# Patient Record
Sex: MALE | Race: BLACK OR AFRICAN AMERICAN | NOT HISPANIC OR LATINO | Employment: OTHER | URBAN - NONMETROPOLITAN AREA
[De-identification: names, ages, dates, MRNs, and addresses within clinical notes are randomized per-mention and may not be internally consistent; named-entity substitution may affect disease eponyms.]

---

## 2018-12-20 ENCOUNTER — TRANSCRIBE ORDERS (OUTPATIENT)
Dept: LAB | Facility: HOSPITAL | Age: 43
End: 2018-12-20

## 2018-12-20 ENCOUNTER — HOSPITAL ENCOUNTER (OUTPATIENT)
Dept: GENERAL RADIOLOGY | Facility: HOSPITAL | Age: 43
Discharge: HOME | End: 2018-12-20
Admitting: NURSE PRACTITIONER

## 2018-12-20 DIAGNOSIS — S86.912A KNEE STRAIN, LEFT, INITIAL ENCOUNTER: Primary | ICD-10-CM

## 2018-12-20 DIAGNOSIS — S86.912A KNEE STRAIN, LEFT, INITIAL ENCOUNTER: ICD-10-CM

## 2018-12-20 PROCEDURE — 73564 X-RAY EXAM KNEE 4 OR MORE: CPT | Performed by: RADIOLOGY

## 2018-12-20 PROCEDURE — 73564 X-RAY EXAM KNEE 4 OR MORE: CPT

## 2018-12-26 ENCOUNTER — APPOINTMENT (OUTPATIENT)
Dept: GENERAL RADIOLOGY | Facility: HOSPITAL | Age: 43
End: 2018-12-26

## 2018-12-26 ENCOUNTER — HOSPITAL ENCOUNTER (EMERGENCY)
Facility: HOSPITAL | Age: 43
Discharge: HOME | End: 2018-12-26
Attending: FAMILY MEDICINE | Admitting: FAMILY MEDICINE

## 2018-12-26 VITALS
WEIGHT: 200 LBS | TEMPERATURE: 97.9 F | BODY MASS INDEX: 28 KG/M2 | SYSTOLIC BLOOD PRESSURE: 126 MMHG | OXYGEN SATURATION: 98 % | HEART RATE: 74 BPM | DIASTOLIC BLOOD PRESSURE: 76 MMHG | HEIGHT: 71 IN | RESPIRATION RATE: 20 BRPM

## 2018-12-26 DIAGNOSIS — M25.562 ACUTE PAIN OF LEFT KNEE: Primary | ICD-10-CM

## 2018-12-26 PROCEDURE — 73564 X-RAY EXAM KNEE 4 OR MORE: CPT | Performed by: RADIOLOGY

## 2018-12-26 PROCEDURE — 73562 X-RAY EXAM OF KNEE 3: CPT

## 2018-12-26 PROCEDURE — 99283 EMERGENCY DEPT VISIT LOW MDM: CPT

## 2018-12-26 RX ORDER — IBUPROFEN 800 MG/1
800 TABLET ORAL EVERY 6 HOURS PRN
COMMUNITY
End: 2019-08-19

## 2019-02-18 ENCOUNTER — TRANSCRIBE ORDERS (OUTPATIENT)
Dept: ADMINISTRATIVE | Facility: HOSPITAL | Age: 44
End: 2019-02-18

## 2019-02-18 DIAGNOSIS — M54.41 LOW BACK PAIN WITH BILATERAL SCIATICA, UNSPECIFIED BACK PAIN LATERALITY, UNSPECIFIED CHRONICITY: Primary | ICD-10-CM

## 2019-02-18 DIAGNOSIS — M54.42 LOW BACK PAIN WITH BILATERAL SCIATICA, UNSPECIFIED BACK PAIN LATERALITY, UNSPECIFIED CHRONICITY: Primary | ICD-10-CM

## 2019-02-20 ENCOUNTER — HOSPITAL ENCOUNTER (OUTPATIENT)
Dept: MRI IMAGING | Facility: HOSPITAL | Age: 44
Discharge: HOME | End: 2019-02-20
Admitting: FAMILY MEDICINE

## 2019-02-20 DIAGNOSIS — M54.41 LOW BACK PAIN WITH BILATERAL SCIATICA, UNSPECIFIED BACK PAIN LATERALITY, UNSPECIFIED CHRONICITY: ICD-10-CM

## 2019-02-20 DIAGNOSIS — M54.42 LOW BACK PAIN WITH BILATERAL SCIATICA, UNSPECIFIED BACK PAIN LATERALITY, UNSPECIFIED CHRONICITY: ICD-10-CM

## 2019-02-20 PROCEDURE — 72148 MRI LUMBAR SPINE W/O DYE: CPT

## 2019-02-20 PROCEDURE — 72148 MRI LUMBAR SPINE W/O DYE: CPT | Performed by: RADIOLOGY

## 2019-03-05 ENCOUNTER — OFFICE VISIT (OUTPATIENT)
Dept: NEUROSURGERY | Facility: CLINIC | Age: 44
End: 2019-03-05

## 2019-03-05 VITALS — BODY MASS INDEX: 28 KG/M2 | HEIGHT: 71 IN | WEIGHT: 200 LBS

## 2019-03-05 DIAGNOSIS — M51.36 BULGING LUMBAR DISC: Primary | ICD-10-CM

## 2019-03-05 DIAGNOSIS — M51.26 HERNIATION OF INTERVERTEBRAL DISC OF LUMBAR SPINE: ICD-10-CM

## 2019-03-05 PROCEDURE — 99243 OFF/OP CNSLTJ NEW/EST LOW 30: CPT | Performed by: NEUROLOGICAL SURGERY
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
## 2019-03-10 ENCOUNTER — HOSPITAL ENCOUNTER (EMERGENCY)
Facility: HOSPITAL | Age: 44
Discharge: HOME | End: 2019-03-10
Attending: EMERGENCY MEDICINE | Admitting: EMERGENCY MEDICINE

## 2019-03-10 VITALS
HEART RATE: 75 BPM | DIASTOLIC BLOOD PRESSURE: 84 MMHG | TEMPERATURE: 98.3 F | WEIGHT: 210 LBS | BODY MASS INDEX: 29.4 KG/M2 | RESPIRATION RATE: 18 BRPM | OXYGEN SATURATION: 98 % | HEIGHT: 71 IN | SYSTOLIC BLOOD PRESSURE: 117 MMHG

## 2019-03-10 DIAGNOSIS — M54.41 MIDLINE LOW BACK PAIN WITH RIGHT-SIDED SCIATICA, UNSPECIFIED CHRONICITY: Primary | ICD-10-CM

## 2019-03-10 PROCEDURE — 96372 THER/PROPH/DIAG INJ SC/IM: CPT

## 2019-03-10 PROCEDURE — 99283 EMERGENCY DEPT VISIT LOW MDM: CPT

## 2019-03-10 PROCEDURE — 25010000002 KETOROLAC TROMETHAMINE PER 15 MG: Performed by: PHYSICIAN ASSISTANT

## 2019-03-10 RX ORDER — KETOROLAC TROMETHAMINE 30 MG/ML
60 INJECTION, SOLUTION INTRAMUSCULAR; INTRAVENOUS ONCE
Status: COMPLETED | OUTPATIENT
Start: 2019-03-10 | End: 2019-03-10

## 2019-03-10 RX ORDER — ORPHENADRINE CITRATE 30 MG/ML
60 INJECTION INTRAMUSCULAR; INTRAVENOUS ONCE
Status: DISCONTINUED | OUTPATIENT
Start: 2019-03-10 | End: 2019-03-10

## 2019-03-10 RX ORDER — ACETAMINOPHEN AND CODEINE PHOSPHATE 300; 30 MG/1; MG/1
1 TABLET ORAL EVERY 6 HOURS PRN
Qty: 12 TABLET | Refills: 0 | Status: SHIPPED | OUTPATIENT
Start: 2019-03-10 | End: 2019-04-07 | Stop reason: SDUPTHER

## 2019-03-10 RX ADMIN — KETOROLAC TROMETHAMINE 60 MG: 60 INJECTION, SOLUTION INTRAMUSCULAR at 10:18

## 2019-03-10 NOTE — ED PROVIDER NOTES
Subjective   Back pain and left knee pain had neg xray on knee at office   Wants something for pain         History provided by:  Patient  Back Pain   Location:  Lumbar spine  Quality:  Aching  Radiates to:  Does not radiate  Pain severity:  Mild  Pain is:  Same all the time  Onset quality:  Gradual  Duration:  3 days  Timing:  Intermittent  Progression:  Worsening  Chronicity:  New  Relieved by:  None tried  Worsened by:  Ambulation  Ineffective treatments:  NSAIDs  Associated symptoms: leg pain    Associated symptoms: no chest pain, no dysuria, no fever, no headaches, no numbness, no paresthesias, no tingling and no weakness        Review of Systems   Constitutional: Negative for chills and fever.   HENT: Negative for congestion, ear pain and sore throat.    Respiratory: Negative for cough, shortness of breath and wheezing.    Cardiovascular: Negative for chest pain.   Gastrointestinal: Negative for diarrhea, nausea and vomiting.   Genitourinary: Negative for dysuria and flank pain.   Musculoskeletal: Positive for arthralgias and back pain.   Skin: Negative for rash.   Neurological: Negative for tingling, weakness, numbness, headaches and paresthesias.   Psychiatric/Behavioral: The patient is not nervous/anxious.    All other systems reviewed and are negative.      No past medical history on file.    No Known Allergies    No past surgical history on file.    No family history on file.    Social History     Socioeconomic History   • Marital status: Single     Spouse name: Not on file   • Number of children: Not on file   • Years of education: Not on file   • Highest education level: Not on file   Tobacco Use   • Smoking status: Current Every Day Smoker     Packs/day: 1.00     Types: Cigarettes   • Smokeless tobacco: Never Used   Substance and Sexual Activity   • Alcohol use: No     Frequency: Never   • Drug use: No   • Sexual activity: Defer           Objective   Physical Exam   Constitutional: He is oriented to  person, place, and time. He appears well-developed and well-nourished.   HENT:   Head: Normocephalic.   Mouth/Throat: Oropharynx is clear and moist.   Neck: Neck supple.   Cardiovascular: Normal rate and regular rhythm.   Pulmonary/Chest: Effort normal and breath sounds normal.   Abdominal: Soft. Bowel sounds are normal. There is no tenderness.   Musculoskeletal:        Left knee: He exhibits decreased range of motion. He exhibits no swelling. Tenderness found.        Lumbar back: He exhibits decreased range of motion, tenderness and pain.   Neurological: He is alert and oriented to person, place, and time.   Skin: Skin is warm and dry.   Psychiatric: He has a normal mood and affect. His behavior is normal. Judgment and thought content normal.   Nursing note and vitals reviewed.      Procedures           ED Course                  MDM      Final diagnoses:   Midline low back pain with right-sided sciatica, unspecified chronicity            Renetta Diego PA  03/11/19 3040

## 2019-03-10 NOTE — ED NOTES
GEMINI Jackson aware that there is no norflex available in facility.      Julia Jennings, RN  03/10/19 [id number]

## 2019-04-07 ENCOUNTER — HOSPITAL ENCOUNTER (EMERGENCY)
Facility: HOSPITAL | Age: 44
Discharge: HOME | End: 2019-04-07
Attending: EMERGENCY MEDICINE | Admitting: EMERGENCY MEDICINE

## 2019-04-07 VITALS
OXYGEN SATURATION: 98 % | SYSTOLIC BLOOD PRESSURE: 136 MMHG | RESPIRATION RATE: 18 BRPM | WEIGHT: 209 LBS | BODY MASS INDEX: 29.26 KG/M2 | HEIGHT: 71 IN | TEMPERATURE: 97.6 F | HEART RATE: 75 BPM | DIASTOLIC BLOOD PRESSURE: 78 MMHG

## 2019-04-07 DIAGNOSIS — M25.562 LEFT KNEE PAIN, UNSPECIFIED CHRONICITY: Primary | ICD-10-CM

## 2019-04-07 PROCEDURE — 25010000002 KETOROLAC TROMETHAMINE PER 15 MG: Performed by: PHYSICIAN ASSISTANT

## 2019-04-07 PROCEDURE — 96372 THER/PROPH/DIAG INJ SC/IM: CPT

## 2019-04-07 PROCEDURE — 99283 EMERGENCY DEPT VISIT LOW MDM: CPT

## 2019-04-07 RX ORDER — ACETAMINOPHEN AND CODEINE PHOSPHATE 300; 30 MG/1; MG/1
1 TABLET ORAL EVERY 6 HOURS PRN
Qty: 12 TABLET | Refills: 0 | OUTPATIENT
Start: 2019-04-07 | End: 2019-05-19 | Stop reason: HOSPADM

## 2019-04-07 RX ORDER — KETOROLAC TROMETHAMINE 30 MG/ML
60 INJECTION, SOLUTION INTRAMUSCULAR; INTRAVENOUS ONCE
Status: COMPLETED | OUTPATIENT
Start: 2019-04-07 | End: 2019-04-07

## 2019-04-07 RX ADMIN — KETOROLAC TROMETHAMINE 60 MG: 60 INJECTION, SOLUTION INTRAMUSCULAR at 10:20

## 2019-04-07 NOTE — ED PROVIDER NOTES
Subjective   43-year-old male presents with complaint of left knee pain that is been ongoing for approximately a month.  Patient states he initially injured his knee around that time and he saw orthopedics in, he did have an MRI that confirmed that he did have a ligament injury.  He states that he has been going to physical therapy, and he does have a follow-up appointment with Navneet CARRERO in 3 days.  He denies any new injury or trauma to the area.  He denies any numbness or tingling.  Denies any increased swelling to the area.        History provided by:  Patient   used: No    Knee Pain   Location:  Knee  Time since incident:  1 month  Injury: no    Knee location:  L knee  Pain details:     Quality:  Aching    Radiates to:  Does not radiate    Severity:  Moderate    Onset quality:  Gradual    Timing:  Constant    Progression:  Waxing and waning  Chronicity:  New  Dislocation: no    Foreign body present:  No foreign bodies  Tetanus status:  Up to date  Prior injury to area:  No  Relieved by:  Rest and immobilization  Worsened by:  Activity  Associated symptoms: decreased ROM    Associated symptoms: no fever, no numbness, no stiffness, no swelling and no tingling    Risk factors: no concern for non-accidental trauma and no obesity        Review of Systems   Constitutional: Negative for activity change and fever.   HENT: Negative for congestion, rhinorrhea and sore throat.    Eyes: Negative for pain and redness.   Respiratory: Negative for cough, shortness of breath and wheezing.    Cardiovascular: Negative for chest pain.   Gastrointestinal: Negative for abdominal distention, diarrhea, nausea and vomiting.   Endocrine: Negative for polyphagia and polyuria.   Genitourinary: Negative for decreased urine volume, difficulty urinating and dysuria.   Musculoskeletal: Negative for arthralgias, myalgias and stiffness.   Skin: Negative for rash and wound.   Allergic/Immunologic: Negative for food allergies and  immunocompromised state.   Neurological: Negative for dizziness and headaches.   Hematological: Negative for adenopathy. Does not bruise/bleed easily.   Psychiatric/Behavioral: Negative for agitation and confusion.   All other systems reviewed and are negative.      No past medical history on file.    No Known Allergies    No past surgical history on file.    No family history on file.    Social History     Socioeconomic History   • Marital status: Single     Spouse name: Not on file   • Number of children: Not on file   • Years of education: Not on file   • Highest education level: Not on file   Tobacco Use   • Smoking status: Current Every Day Smoker     Packs/day: 1.00     Types: Cigarettes   • Smokeless tobacco: Never Used   Substance and Sexual Activity   • Alcohol use: No     Frequency: Never   • Drug use: No   • Sexual activity: Defer           Objective   Physical Exam   Constitutional: He is oriented to person, place, and time. He appears well-developed and well-nourished.   HENT:   Head: Normocephalic and atraumatic.   Eyes: EOM are normal. Pupils are equal, round, and reactive to light.   Neck: Normal range of motion. Neck supple.   Cardiovascular: Normal rate, regular rhythm and normal heart sounds.   Pulmonary/Chest: Effort normal and breath sounds normal.   Abdominal: Soft. Bowel sounds are normal.   Musculoskeletal: Normal range of motion.        Left knee: He exhibits no swelling, no effusion and no ecchymosis. Tenderness found. Medial joint line tenderness noted.   Neurological: He is alert and oriented to person, place, and time.   Skin: Skin is warm and dry.   Psychiatric: He has a normal mood and affect. His behavior is normal. Judgment and thought content normal.   Nursing note and vitals reviewed.      Procedures           ED Course                  MDM  Number of Diagnoses or Management Options     Amount and/or Complexity of Data Reviewed  Clinical lab tests: ordered and reviewed  Tests in  the radiology section of CPT®: ordered and reviewed  Tests in the medicine section of CPT®: reviewed and ordered    Patient Progress  Patient progress: stable        Final diagnoses:   Left knee pain, unspecified chronicity            Aleksandar Boucher PA  04/07/19 1244

## 2019-05-07 ENCOUNTER — OFFICE VISIT (OUTPATIENT)
Dept: NEUROSURGERY | Facility: CLINIC | Age: 44
End: 2019-05-07

## 2019-05-07 DIAGNOSIS — M51.36 BULGING LUMBAR DISC: ICD-10-CM

## 2019-05-07 DIAGNOSIS — M51.26 HERNIATION OF INTERVERTEBRAL DISC OF LUMBAR SPINE: Primary | ICD-10-CM

## 2019-05-07 PROCEDURE — 99213 OFFICE O/P EST LOW 20 MIN: CPT | Performed by: NEUROLOGICAL SURGERY

## 2019-05-07 NOTE — PROGRESS NOTES
Subjective   Carlo Wellington is a 43 y.o. male who presents for follow up of low back pain.     The patient has had a chronic back pain for years that has become constant in the past several months and bother him particularly with sitting or even lying down.  He feels better actually when he is standing.  His lumbar MRI scan reviewed on his last visit on March 5 showed advanced degenerative disc at L3-4 and L4-5 with central herniations at both levels.  L5-S1 is a transitional space.  He has been in physical therapy since I saw him 2 months ago.  His pain has improved may be 50%, but is still bothersome.  He is also having trouble with his right heel and apparently is going to have a consultation for surgical treatment to his right foot.    The following portions of the patient's history were reviewed, updated as appropriate and approved: allergies, current medications, past family history, past medical history, past social history, past surgical history, review of systems and problem list.     Review of Systems   Constitutional: Negative for activity change, appetite change, chills, diaphoresis, fatigue, fever and unexpected weight change.   HENT: Negative for congestion, dental problem, drooling, ear discharge, ear pain, facial swelling, hearing loss, mouth sores, nosebleeds, postnasal drip, rhinorrhea, sinus pressure, sneezing, sore throat, tinnitus, trouble swallowing and voice change.    Eyes: Negative for photophobia, pain, discharge, redness, itching and visual disturbance.   Respiratory: Negative for apnea, cough, choking, chest tightness, shortness of breath, wheezing and stridor.    Cardiovascular: Negative for chest pain, palpitations and leg swelling.   Gastrointestinal: Negative for abdominal distention, abdominal pain, anal bleeding, blood in stool, constipation, diarrhea, nausea, rectal pain and vomiting.   Endocrine: Negative for cold intolerance, heat intolerance, polydipsia, polyphagia and  polyuria.   Genitourinary: Negative for decreased urine volume, difficulty urinating, dysuria, enuresis, flank pain, frequency, genital sores, hematuria and urgency.   Musculoskeletal: Positive for arthralgias, back pain, joint swelling, myalgias, neck pain and neck stiffness. Negative for gait problem.   Skin: Negative for color change, pallor, rash and wound.   Allergic/Immunologic: Negative for environmental allergies, food allergies and immunocompromised state.   Neurological: Negative for dizziness, tremors, seizures, syncope, facial asymmetry, speech difficulty, weakness, light-headedness, numbness and headaches.   Hematological: Negative for adenopathy. Does not bruise/bleed easily.   Psychiatric/Behavioral: Negative for agitation, behavioral problems, confusion, decreased concentration, dysphoric mood, hallucinations, self-injury, sleep disturbance and suicidal ideas. The patient is not nervous/anxious and is not hyperactive.        Objective    NEUROLOGICAL EXAMINATION:    Intact motor and sensory function lower extremities.  No reflex asymmetry.    Assessment   Chronic back pain, degenerative disc L3-4 and L4-5.  Transitional L5-S1 space.  Partial improvement with physical therapy.       Plan   Referral for epidural steroid injection.  There is no surgery to be recommended.  Hopefully the PORFIRIO will further improve his chronic back pain due to degenerative disc disease.       Angel Jules MD

## 2019-05-19 ENCOUNTER — APPOINTMENT (OUTPATIENT)
Dept: CT IMAGING | Facility: HOSPITAL | Age: 44
End: 2019-05-19

## 2019-05-19 ENCOUNTER — HOSPITAL ENCOUNTER (EMERGENCY)
Facility: HOSPITAL | Age: 44
Discharge: HOME | End: 2019-05-19
Attending: FAMILY MEDICINE | Admitting: FAMILY MEDICINE

## 2019-05-19 VITALS
HEIGHT: 71 IN | RESPIRATION RATE: 18 BRPM | TEMPERATURE: 98.2 F | WEIGHT: 210 LBS | HEART RATE: 75 BPM | OXYGEN SATURATION: 98 % | DIASTOLIC BLOOD PRESSURE: 70 MMHG | SYSTOLIC BLOOD PRESSURE: 124 MMHG | BODY MASS INDEX: 29.4 KG/M2

## 2019-05-19 DIAGNOSIS — M47.817 SPONDYLOSIS OF LUMBOSACRAL REGION, UNSPECIFIED SPINAL OSTEOARTHRITIS COMPLICATION STATUS: Primary | ICD-10-CM

## 2019-05-19 PROCEDURE — 72131 CT LUMBAR SPINE W/O DYE: CPT

## 2019-05-19 PROCEDURE — 72131 CT LUMBAR SPINE W/O DYE: CPT | Performed by: RADIOLOGY

## 2019-05-19 PROCEDURE — 99283 EMERGENCY DEPT VISIT LOW MDM: CPT

## 2019-05-19 RX ORDER — HYDROCODONE BITARTRATE AND ACETAMINOPHEN 5; 325 MG/1; MG/1
1 TABLET ORAL ONCE
Status: COMPLETED | OUTPATIENT
Start: 2019-05-19 | End: 2019-05-19

## 2019-05-19 RX ORDER — CYCLOBENZAPRINE HCL 10 MG
10 TABLET ORAL 3 TIMES DAILY PRN
Qty: 12 TABLET | Refills: 0 | Status: SHIPPED | OUTPATIENT
Start: 2019-05-19 | End: 2019-06-05

## 2019-05-19 RX ADMIN — HYDROCODONE BITARTRATE AND ACETAMINOPHEN 1 TABLET: 5; 325 TABLET ORAL at 15:11

## 2019-05-28 ENCOUNTER — PREP FOR SURGERY (OUTPATIENT)
Dept: OTHER | Facility: HOSPITAL | Age: 44
End: 2019-05-28

## 2019-05-28 DIAGNOSIS — M21.41 PES PLANUS OF RIGHT FOOT: ICD-10-CM

## 2019-05-28 DIAGNOSIS — M25.374 INSTABILITY OF RIGHT FOOT JOINT: Primary | ICD-10-CM

## 2019-05-28 DIAGNOSIS — M21.611 BUNION OF RIGHT FOOT: ICD-10-CM

## 2019-05-28 DIAGNOSIS — M79.671 RIGHT FOOT PAIN: ICD-10-CM

## 2019-05-28 RX ORDER — CEFAZOLIN SODIUM 2 G/50ML
2 SOLUTION INTRAVENOUS ONCE
Status: CANCELLED | OUTPATIENT
Start: 2019-06-18 | End: 2019-05-28

## 2019-06-05 ENCOUNTER — APPOINTMENT (OUTPATIENT)
Dept: PREADMISSION TESTING | Facility: HOSPITAL | Age: 44
End: 2019-06-05

## 2019-06-05 DIAGNOSIS — M21.41 PES PLANUS OF RIGHT FOOT: ICD-10-CM

## 2019-06-05 DIAGNOSIS — M25.374 INSTABILITY OF RIGHT FOOT JOINT: ICD-10-CM

## 2019-06-05 DIAGNOSIS — M21.611 BUNION OF RIGHT FOOT: ICD-10-CM

## 2019-06-05 DIAGNOSIS — M79.671 RIGHT FOOT PAIN: ICD-10-CM

## 2019-06-05 LAB
ANION GAP SERPL CALCULATED.3IONS-SCNC: 12.4 MMOL/L
BASOPHILS # BLD AUTO: 0.01 10*3/MM3 (ref 0–0.2)
BASOPHILS NFR BLD AUTO: 0.1 % (ref 0–1.5)
BILIRUB UR QL STRIP: NEGATIVE
BUN BLD-MCNC: 15 MG/DL (ref 6–20)
BUN/CREAT SERPL: 12.6 (ref 7–25)
CALCIUM SPEC-SCNC: 9.6 MG/DL (ref 8.6–10.5)
CHLORIDE SERPL-SCNC: 100 MMOL/L (ref 98–107)
CLARITY UR: CLEAR
CO2 SERPL-SCNC: 27.6 MMOL/L (ref 22–29)
COLOR UR: YELLOW
CREAT BLD-MCNC: 1.19 MG/DL (ref 0.76–1.27)
DEPRECATED RDW RBC AUTO: 46.1 FL (ref 37–54)
EOSINOPHIL # BLD AUTO: 0.03 10*3/MM3 (ref 0–0.4)
EOSINOPHIL NFR BLD AUTO: 0.3 % (ref 0.3–6.2)
ERYTHROCYTE [DISTWIDTH] IN BLOOD BY AUTOMATED COUNT: 14.9 % (ref 12.3–15.4)
GFR SERPL CREATININE-BSD FRML MDRD: 82 ML/MIN/1.73
GLUCOSE BLD-MCNC: 114 MG/DL (ref 65–99)
GLUCOSE UR STRIP-MCNC: NEGATIVE MG/DL
HCT VFR BLD AUTO: 46.9 % (ref 37.5–51)
HGB BLD-MCNC: 15 G/DL (ref 13–17.7)
HGB UR QL STRIP.AUTO: NEGATIVE
IMM GRANULOCYTES # BLD AUTO: 0.02 10*3/MM3 (ref 0–0.05)
IMM GRANULOCYTES NFR BLD AUTO: 0.2 % (ref 0–0.5)
KETONES UR QL STRIP: NEGATIVE
LEUKOCYTE ESTERASE UR QL STRIP.AUTO: NEGATIVE
LYMPHOCYTES # BLD AUTO: 2.09 10*3/MM3 (ref 0.7–3.1)
LYMPHOCYTES NFR BLD AUTO: 20.5 % (ref 19.6–45.3)
MCH RBC QN AUTO: 26.6 PG (ref 26.6–33)
MCHC RBC AUTO-ENTMCNC: 32 G/DL (ref 31.5–35.7)
MCV RBC AUTO: 83.3 FL (ref 79–97)
MONOCYTES # BLD AUTO: 0.78 10*3/MM3 (ref 0.1–0.9)
MONOCYTES NFR BLD AUTO: 7.6 % (ref 5–12)
NEUTROPHILS # BLD AUTO: 7.29 10*3/MM3 (ref 1.7–7)
NEUTROPHILS NFR BLD AUTO: 71.3 % (ref 42.7–76)
NITRITE UR QL STRIP: NEGATIVE
PH UR STRIP.AUTO: 6.5 [PH] (ref 5–8)
PLATELET # BLD AUTO: 205 10*3/MM3 (ref 140–450)
PMV BLD AUTO: 11.4 FL (ref 6–12)
POTASSIUM BLD-SCNC: 4.3 MMOL/L (ref 3.5–5.2)
PROT UR QL STRIP: NEGATIVE
RBC # BLD AUTO: 5.63 10*6/MM3 (ref 4.14–5.8)
SODIUM BLD-SCNC: 140 MMOL/L (ref 136–145)
SP GR UR STRIP: 1.02 (ref 1–1.03)
UROBILINOGEN UR QL STRIP: NORMAL
WBC NRBC COR # BLD: 10.22 10*3/MM3 (ref 3.4–10.8)

## 2019-06-05 PROCEDURE — 81003 URINALYSIS AUTO W/O SCOPE: CPT | Performed by: PODIATRIST

## 2019-06-05 PROCEDURE — 36415 COLL VENOUS BLD VENIPUNCTURE: CPT

## 2019-06-05 PROCEDURE — G0480 DRUG TEST DEF 1-7 CLASSES: HCPCS | Performed by: PODIATRIST

## 2019-06-05 PROCEDURE — 80048 BASIC METABOLIC PNL TOTAL CA: CPT | Performed by: PODIATRIST

## 2019-06-05 PROCEDURE — 85025 COMPLETE CBC W/AUTO DIFF WBC: CPT | Performed by: PODIATRIST

## 2019-06-05 NOTE — DISCHARGE INSTRUCTIONS

## 2019-06-09 LAB
COTININE UR-MCNC: 69.8 NG/ML
NICOTINE SERPL-MCNC: NORMAL NG/ML

## 2019-06-18 ENCOUNTER — ANESTHESIA EVENT (OUTPATIENT)
Dept: PERIOP | Facility: HOSPITAL | Age: 44
End: 2019-06-18

## 2019-06-18 ENCOUNTER — APPOINTMENT (OUTPATIENT)
Dept: GENERAL RADIOLOGY | Facility: HOSPITAL | Age: 44
End: 2019-06-18

## 2019-06-18 ENCOUNTER — HOSPITAL ENCOUNTER (OUTPATIENT)
Facility: HOSPITAL | Age: 44
Setting detail: HOSPITAL OUTPATIENT SURGERY
Discharge: HOME | End: 2019-06-18
Attending: PODIATRIST | Admitting: PODIATRIST

## 2019-06-18 ENCOUNTER — ANESTHESIA (OUTPATIENT)
Dept: PERIOP | Facility: HOSPITAL | Age: 44
End: 2019-06-18

## 2019-06-18 VITALS
HEIGHT: 71 IN | TEMPERATURE: 97.2 F | DIASTOLIC BLOOD PRESSURE: 86 MMHG | SYSTOLIC BLOOD PRESSURE: 136 MMHG | OXYGEN SATURATION: 96 % | WEIGHT: 200.4 LBS | RESPIRATION RATE: 16 BRPM | HEART RATE: 70 BPM | BODY MASS INDEX: 28.06 KG/M2

## 2019-06-18 DIAGNOSIS — M21.41 PES PLANUS OF RIGHT FOOT: ICD-10-CM

## 2019-06-18 DIAGNOSIS — M25.374 INSTABILITY OF RIGHT FOOT JOINT: ICD-10-CM

## 2019-06-18 DIAGNOSIS — M79.671 RIGHT FOOT PAIN: ICD-10-CM

## 2019-06-18 DIAGNOSIS — M21.611 BUNION OF RIGHT FOOT: ICD-10-CM

## 2019-06-18 PROCEDURE — 25010000002 MIDAZOLAM PER 1 MG: Performed by: ANESTHESIOLOGY

## 2019-06-18 PROCEDURE — 25010000002 ONDANSETRON PER 1 MG: Performed by: NURSE ANESTHETIST, CERTIFIED REGISTERED

## 2019-06-18 PROCEDURE — 25010000003 CEFAZOLIN SODIUM-DEXTROSE 2-3 GM-%(50ML) RECONSTITUTED SOLUTION: Performed by: PODIATRIST

## 2019-06-18 PROCEDURE — 25010000002 DEXAMETHASONE PER 1 MG: Performed by: NURSE ANESTHETIST, CERTIFIED REGISTERED

## 2019-06-18 PROCEDURE — 25010000002 FENTANYL CITRATE (PF) 100 MCG/2ML SOLUTION: Performed by: NURSE ANESTHETIST, CERTIFIED REGISTERED

## 2019-06-18 PROCEDURE — 76000 FLUOROSCOPY <1 HR PHYS/QHP: CPT | Performed by: RADIOLOGY

## 2019-06-18 PROCEDURE — C1713 ANCHOR/SCREW BN/BN,TIS/BN: HCPCS | Performed by: PODIATRIST

## 2019-06-18 PROCEDURE — 25010000002 KETOROLAC TROMETHAMINE PER 15 MG: Performed by: NURSE ANESTHETIST, CERTIFIED REGISTERED

## 2019-06-18 PROCEDURE — 76000 FLUOROSCOPY <1 HR PHYS/QHP: CPT

## 2019-06-18 PROCEDURE — C1776 JOINT DEVICE (IMPLANTABLE): HCPCS | Performed by: PODIATRIST

## 2019-06-18 DEVICE — CANNULATED SCREW
Type: IMPLANTABLE DEVICE | Status: FUNCTIONAL
Brand: ASNIS

## 2019-06-18 DEVICE — OSTEOSYNTHESIS COMPRESSION STAPLE
Type: IMPLANTABLE DEVICE | Status: FUNCTIONAL
Brand: EASY CLIP

## 2019-06-18 DEVICE — ARTHROERESIS IMPLANT
Type: IMPLANTABLE DEVICE | Status: FUNCTIONAL
Brand: SUBFIX

## 2019-06-18 RX ORDER — CEFAZOLIN SODIUM 2 G/50ML
2 SOLUTION INTRAVENOUS ONCE
Status: COMPLETED | OUTPATIENT
Start: 2019-06-18 | End: 2019-06-18

## 2019-06-18 RX ORDER — ONDANSETRON 2 MG/ML
4 INJECTION INTRAMUSCULAR; INTRAVENOUS ONCE AS NEEDED
Status: DISCONTINUED | OUTPATIENT
Start: 2019-06-18 | End: 2019-06-18 | Stop reason: HOSPADM

## 2019-06-18 RX ORDER — KETOROLAC TROMETHAMINE 30 MG/ML
INJECTION, SOLUTION INTRAMUSCULAR; INTRAVENOUS AS NEEDED
Status: DISCONTINUED | OUTPATIENT
Start: 2019-06-18 | End: 2019-06-18 | Stop reason: SURG

## 2019-06-18 RX ORDER — SODIUM CHLORIDE, SODIUM LACTATE, POTASSIUM CHLORIDE, CALCIUM CHLORIDE 600; 310; 30; 20 MG/100ML; MG/100ML; MG/100ML; MG/100ML
125 INJECTION, SOLUTION INTRAVENOUS CONTINUOUS
Status: DISCONTINUED | OUTPATIENT
Start: 2019-06-18 | End: 2019-06-18 | Stop reason: HOSPADM

## 2019-06-18 RX ORDER — SODIUM CHLORIDE 0.9 % (FLUSH) 0.9 %
3-10 SYRINGE (ML) INJECTION AS NEEDED
Status: DISCONTINUED | OUTPATIENT
Start: 2019-06-18 | End: 2019-06-18 | Stop reason: HOSPADM

## 2019-06-18 RX ORDER — GLYCOPYRROLATE 0.2 MG/ML
INJECTION INTRAMUSCULAR; INTRAVENOUS AS NEEDED
Status: DISCONTINUED | OUTPATIENT
Start: 2019-06-18 | End: 2019-06-18 | Stop reason: SURG

## 2019-06-18 RX ORDER — IPRATROPIUM BROMIDE AND ALBUTEROL SULFATE 2.5; .5 MG/3ML; MG/3ML
3 SOLUTION RESPIRATORY (INHALATION) ONCE AS NEEDED
Status: DISCONTINUED | OUTPATIENT
Start: 2019-06-18 | End: 2019-06-18 | Stop reason: HOSPADM

## 2019-06-18 RX ORDER — FENTANYL CITRATE 50 UG/ML
INJECTION, SOLUTION INTRAMUSCULAR; INTRAVENOUS AS NEEDED
Status: DISCONTINUED | OUTPATIENT
Start: 2019-06-18 | End: 2019-06-18 | Stop reason: SURG

## 2019-06-18 RX ORDER — PREDNISONE 10 MG/1
50 TABLET ORAL DAILY
COMMUNITY
End: 2019-08-19

## 2019-06-18 RX ORDER — BUPIVACAINE HYDROCHLORIDE 5 MG/ML
INJECTION, SOLUTION PERINEURAL AS NEEDED
Status: DISCONTINUED | OUTPATIENT
Start: 2019-06-18 | End: 2019-06-18 | Stop reason: HOSPADM

## 2019-06-18 RX ORDER — LIDOCAINE HYDROCHLORIDE 10 MG/ML
INJECTION, SOLUTION INFILTRATION; PERINEURAL AS NEEDED
Status: DISCONTINUED | OUTPATIENT
Start: 2019-06-18 | End: 2019-06-18 | Stop reason: HOSPADM

## 2019-06-18 RX ORDER — OXYCODONE AND ACETAMINOPHEN 10; 325 MG/1; MG/1
2 TABLET ORAL ONCE AS NEEDED
Status: DISCONTINUED | OUTPATIENT
Start: 2019-06-18 | End: 2019-06-18 | Stop reason: HOSPADM

## 2019-06-18 RX ORDER — ONDANSETRON 4 MG/1
4 TABLET, FILM COATED ORAL EVERY 8 HOURS PRN
Qty: 15 TABLET | Refills: 0 | Status: SHIPPED | OUTPATIENT
Start: 2019-06-18 | End: 2019-08-19

## 2019-06-18 RX ORDER — ONDANSETRON 2 MG/ML
INJECTION INTRAMUSCULAR; INTRAVENOUS AS NEEDED
Status: DISCONTINUED | OUTPATIENT
Start: 2019-06-18 | End: 2019-06-18 | Stop reason: SURG

## 2019-06-18 RX ORDER — FENTANYL CITRATE 50 UG/ML
100 INJECTION, SOLUTION INTRAMUSCULAR; INTRAVENOUS
Status: DISCONTINUED | OUTPATIENT
Start: 2019-06-18 | End: 2019-06-18 | Stop reason: HOSPADM

## 2019-06-18 RX ORDER — MAGNESIUM HYDROXIDE 1200 MG/15ML
LIQUID ORAL AS NEEDED
Status: DISCONTINUED | OUTPATIENT
Start: 2019-06-18 | End: 2019-06-18 | Stop reason: HOSPADM

## 2019-06-18 RX ORDER — OXYCODONE HYDROCHLORIDE AND ACETAMINOPHEN 5; 325 MG/1; MG/1
1 TABLET ORAL ONCE
Status: COMPLETED | OUTPATIENT
Start: 2019-06-18 | End: 2019-06-18

## 2019-06-18 RX ORDER — FAMOTIDINE 10 MG/ML
INJECTION, SOLUTION INTRAVENOUS AS NEEDED
Status: DISCONTINUED | OUTPATIENT
Start: 2019-06-18 | End: 2019-06-18 | Stop reason: SURG

## 2019-06-18 RX ORDER — MIDAZOLAM HYDROCHLORIDE 1 MG/ML
2 INJECTION INTRAMUSCULAR; INTRAVENOUS
Status: COMPLETED | OUTPATIENT
Start: 2019-06-18 | End: 2019-06-18

## 2019-06-18 RX ORDER — CEPHALEXIN 500 MG/1
500 CAPSULE ORAL 3 TIMES DAILY
Qty: 21 CAPSULE | Refills: 0 | Status: SHIPPED | OUTPATIENT
Start: 2019-06-18 | End: 2019-06-25

## 2019-06-18 RX ORDER — DEXAMETHASONE SODIUM PHOSPHATE 4 MG/ML
INJECTION, SOLUTION INTRA-ARTICULAR; INTRALESIONAL; INTRAMUSCULAR; INTRAVENOUS; SOFT TISSUE AS NEEDED
Status: DISCONTINUED | OUTPATIENT
Start: 2019-06-18 | End: 2019-06-18 | Stop reason: SURG

## 2019-06-18 RX ORDER — OXYCODONE HYDROCHLORIDE AND ACETAMINOPHEN 5; 325 MG/1; MG/1
1 TABLET ORAL EVERY 4 HOURS PRN
Qty: 40 TABLET | Refills: 0 | Status: SHIPPED | OUTPATIENT
Start: 2019-06-18 | End: 2019-08-19

## 2019-06-18 RX ORDER — MIDAZOLAM HYDROCHLORIDE 1 MG/ML
1 INJECTION INTRAMUSCULAR; INTRAVENOUS
Status: COMPLETED | OUTPATIENT
Start: 2019-06-18 | End: 2019-06-18

## 2019-06-18 RX ORDER — MEPERIDINE HYDROCHLORIDE 25 MG/ML
12.5 INJECTION INTRAMUSCULAR; INTRAVENOUS; SUBCUTANEOUS
Status: DISCONTINUED | OUTPATIENT
Start: 2019-06-18 | End: 2019-06-18 | Stop reason: HOSPADM

## 2019-06-18 RX ORDER — SODIUM CHLORIDE 0.9 % (FLUSH) 0.9 %
3 SYRINGE (ML) INJECTION EVERY 12 HOURS SCHEDULED
Status: DISCONTINUED | OUTPATIENT
Start: 2019-06-18 | End: 2019-06-18 | Stop reason: HOSPADM

## 2019-06-18 RX ADMIN — DEXAMETHASONE SODIUM PHOSPHATE 4 MG: 4 INJECTION, SOLUTION INTRAMUSCULAR; INTRAVENOUS at 07:41

## 2019-06-18 RX ADMIN — OXYCODONE HYDROCHLORIDE AND ACETAMINOPHEN 1 TABLET: 5; 325 TABLET ORAL at 10:43

## 2019-06-18 RX ADMIN — GLYCOPYRROLATE 0.2 MG: 0.2 INJECTION, SOLUTION INTRAMUSCULAR; INTRAVENOUS at 07:31

## 2019-06-18 RX ADMIN — KETOROLAC TROMETHAMINE 30 MG: 30 INJECTION, SOLUTION INTRAMUSCULAR; INTRAVENOUS at 07:42

## 2019-06-18 RX ADMIN — MIDAZOLAM HYDROCHLORIDE 2 MG: 1 INJECTION, SOLUTION INTRAMUSCULAR; INTRAVENOUS at 07:28

## 2019-06-18 RX ADMIN — SODIUM CHLORIDE, POTASSIUM CHLORIDE, SODIUM LACTATE AND CALCIUM CHLORIDE 125 ML/HR: 600; 310; 30; 20 INJECTION, SOLUTION INTRAVENOUS at 07:16

## 2019-06-18 RX ADMIN — FENTANYL CITRATE 50 MCG: 50 INJECTION INTRAMUSCULAR; INTRAVENOUS at 10:14

## 2019-06-18 RX ADMIN — CEFAZOLIN SODIUM 2 G: 2 SOLUTION INTRAVENOUS at 07:28

## 2019-06-18 RX ADMIN — FENTANYL CITRATE 50 MCG: 50 INJECTION INTRAMUSCULAR; INTRAVENOUS at 10:04

## 2019-06-18 RX ADMIN — MIDAZOLAM HYDROCHLORIDE 2 MG: 1 INJECTION, SOLUTION INTRAMUSCULAR; INTRAVENOUS at 07:20

## 2019-06-18 RX ADMIN — FENTANYL CITRATE 100 MCG: 50 INJECTION INTRAMUSCULAR; INTRAVENOUS at 07:28

## 2019-06-18 RX ADMIN — ONDANSETRON 4 MG: 2 INJECTION, SOLUTION INTRAMUSCULAR; INTRAVENOUS at 07:28

## 2019-06-18 RX ADMIN — FAMOTIDINE 20 MG: 10 INJECTION, SOLUTION INTRAVENOUS at 07:28

## 2019-06-18 RX ADMIN — FENTANYL CITRATE 100 MCG: 50 INJECTION INTRAMUSCULAR; INTRAVENOUS at 08:19

## 2019-06-18 NOTE — OP NOTE
Carlo Flowesrdrow  6/18/2019      Operative Progress Note:    Surgeon and Assistant: Dr. Sybil Mortensen DPM    Pre-Operative Diagnosis:   1.  Right ankle joint contracture  2.  Right subtalar joint instability  3.  Right foot bunion    Post-Operative Diagnosis:   1.  Right ankle joint contracture  2.  Right subtalar joint instability  3.  Right foot bunion    Procedure(s):    1.  Open gastrocnemius recession. CPT 58248  2.  Open right subtalar joint stabilization. CPT 42861  3.  Bunionectomy by double osteotomy. CPT 40775    Type of Anesthesia Administered: General with local block consisting of 10cc 50:50 mix 1% lidocaine and 0.5% marcaine    Estimated Blood Loss: 4mL    Hemostasis: Thigh tourniquet inflated to 300mmHg    Blood Products: None    Specimen Obtained/Removed: None    Complication(s):  None    Graft/Implant/Prosthetics/Implanted Device/Transplants: Juju Subfix subtalar joint implant. Juju 3.0mm Asnis screws x2. Kingston staple size 10mm.    Indication: 43-year-old male with no significant past medical history of persistent right foot pain associated with flatfoot and bunion for many years.  He has continued pain despite attempting multiple conservative treatments including shoe gear modifications, injections, padding, and medications. He elects to proceed today with surgery after thorough discussion of all risks, alternatives, benefits.    Findings: Equinus, subtalar joint instability, bunion    Operative Report:  Patient was identified in the preoperative holding area, formal consent was signed in the right lower extremity was marked correct site.  Patient was brought to the operating suite and placed the operating table in the supine position.  Following time out patient underwent anesthesia as dictated above.  A well-padded right thigh tourniquet was applied.  The right foot was then scrubbed prepped and draped the usual sterile manner.  Preoperative timeout was performed.     Attention was focused to  the posterior medial aspect of the right calf.  A longitudinal incision was made 4 fingerbreadths distal to the distal border of the gastrocnemius muscle belly.  Blunt dissection using a mosquito hemostat was utilized until the aponeurosis was identified.  A 15 blade was used to transect the aponeurosis until underlying soleus muscle was identified.  There was noted to be an increase in ankle dorsiflexion at this time.  This incision was copiously irrigated with sterile saline and closed in layers using 3-0 Monocryl and 3-0 nylon.    Attention was then focused to the lateral aspect of the subtalar joint at the location of the sinus tarsi.  Esmarch exsanguination was utilized and the tourniquet was inflated.  Curvilinear incision was made in line with the relaxed skin tension lines.  Blunt dissection was carried out into the sinus tarsi.  The guidewire was inserted and noted to be within the sinus tarsi on fluoroscopy.  [employer] subfix sizers were introduced into the subtalar joint and inversion and eversion were tested.  The size of 11.5 mm implant was noted to have an appropriate reduction in unstable joint eversion.  The sizer was removed and the implant was screwed into place over the guidewire.  Fluoroscopy was utilized to ensure correct placement of the implant.  Guidewire was removed. This incision was closed in layers using 3-0 Monocryl and 3-0 nylon.    Attention was then noted to the first metatarsal phalangeal joint and a longitudinal incision was made at the dorsal medial aspect extending from the mid metatarsal shaft to the hallux interphalangeal joint.  Layered dissection took place using care to retract all neurovascular structures, using Bovie hemostasis as needed.  The extensor hallucis longus tendon was identified periosteal incision was made medial to the tendon.  Inverted L capsulorrhaphy was then performed at the metatarsophalangeal joint.  Dissection was carried out at the lateral aspect of the  joint where the adductor tendons were resected from the lateral aspect of the proximal phalanx using 15 blade.  A sagittal saw was utilized to remove the dorsal medial eminence of the metatarsal head.  A long-arm Pete osteotomy was then performed with the apex at the central metatarsal head.  The capital fragment was moved laterally and held in place using K wires and fixated using two 3.0mm screws.  Decrease in intermetatarsal angle as well as appropriate screw position was confirmed using fluoroscopy.  Attention was then focused to the hallux proximal phalanx sagittal saw was utilized to make an Akin osteotomy in order to correct the hallux abductus angle.  This was fixated using an 10 mm [employer] easy clip staple.  Rongeur and power rachael was utilized to remove the overhanging bone from the medial metatarsal shaft.  The surgical site was copiously irrigated with sterile saline.  Capsule was closed with 2-0 Monocryl.  Skin and subcutaneous tissue closed with 3-0 Monocryl followed by 3-0 Nylon.  The tourniquet was deflated there was noted to be brisk capillary refill to all digits.  A dry sterile dressing was applied.  The patient was extubated and transferred to PACU with vital signs stable.  He will be discharged home today with instructions to be heel touch weightbearing only to the right lower extremity in a controlled ankle motion boot and to leave dressings clean dry and intact.          Electronically Signed by: Sybil Mortensen DPM        Dictated Utilizing Dragon Dictation

## 2019-06-18 NOTE — H&P
PODIATRIC SURGERY  History and Physical      Principal problem: <principal problem not specified>    Subjective .     History of present illness:    Mr. Carlo Wellington is a 43 y.o. years old male with no significant past medical history. Previous cigarette smoker, reports quitting to prepare for surgery. He presents with complaint of right foot pain associated with his bunion and flat foot for many years. He has continued pain despite attempting multiple conservative treatments including shoe gear modifications, injections, padding, and medications. He presents today for surgical treatment. No changes since last seen as outpatient.       History taken from: patient    Case was discussed with patient    Review of Systems    Constitutional: no fever, chills and night sweats. No appetite change or unexpected weight change. No fatigue.  Eyes: no eye drainage, itching or redness.  HEENT: no mouth sores, dysphagia or nose bleed.  Respiratory: no for shortness of breath, cough or production of sputum.  Cardiovascular: no chest pain, no palpitations, no orthopnea.  Gastrointestinal: no nausea, vomiting or diarrhea. No abdominal pain, hematemesis or rectal bleeding.  Genitourinary: no dysuria or polyuria.  Hematologic/lymphatic: no lymph node abnormalities, no easy bruising or easy bleeding.  Musculoskeletal: no muscle or joint pain.  Skin: No rash and no itching.  Neurological: no loss of consciousness, no seizure, no headache.  Behavioral/Psych: no depression or suicidal ideation.  Endocrine: no hot flashes.  Immunologic: negative.    Past Medical History    Past Medical History:   Diagnosis Date   • Back pain    • Bunion    • Depression    • Knee pain, left    • Lumbar disc herniation     X 2       Past Surgical History    No past surgical history on file.    Family History    No family history on file.    Social History    Social History     Tobacco Use   • Smoking status: Current Every Day Smoker     Packs/day: 1.00      Types: Cigarettes   • Smokeless tobacco: Never Used   Substance Use Topics   • Alcohol use: No     Frequency: Never     Comment: OCASSIONALLY   • Drug use: No       Allergies    Patient has no known allergies.    Objective     There were no vitals taken for this visit.           No intake or output data in the 24 hours ending 06/18/19 0658      Physical Exam:     General Appearance:    Alert, cooperative, in no acute distress   Head:    Normocephalic, without obvious abnormality, atraumatic    Heart:    Regular rhythm and normal rate     Chest Wall:    No abnormalities observed   Abdomen:    Soft non-tender, non-distended, no guarding, no rebound                tenderness   Extremities:   Moves all extremities well, no edema, no cyanosis, no             Redness. Excessive eversion of right subtalar joint. Tracking hallux abductovalgus right foot.   Pulses:   Pulses palpable and equal bilaterally   Skin:   No bleeding, bruising or rash   Lymph nodes:   No palpable adenopathy                   Lab Results   Component Value Date    NEUTROABS 7.29 (H) 06/05/2019                   Imaging Results (last 24 hours)     ** No results found for the last 24 hours. **            Results Review:    I have personally reviewed laboratory data, culture results, radiology studies and antimicrobial therapy.    Hospital Medications (active)       Dose Frequency Start End    ceFAZolin Sodium-Dextrose (ANCEF) IVPB (duplex) 2 g 2 g Once 6/18/2019     Sig - Route: Infuse 2,000 mg into a venous catheter 1 (One) Time. - Intravenous            PROBLEM LIST:    Patient Active Problem List   Diagnosis   • Herniation of intervertebral disc of lumbar spine   • Bunion of right foot   • Pes planus of right foot   • Instability of right foot joint   • Right foot pain       Assessment/Plan     ASSESSMENT:    43 year old male with right foot subtalar joint instability and bunion    PLAN:    -Patient elects to proceed with surgical treatment today.  He is aware of the detrimental effects of smoking on healing as well as, non-healing, infection, need for further surgery.       Patients findings and recommendations were discussed with patient    Code Status:   There are no questions and answers to display.       Sybil Mortensen DPM  06/18/19  6:58 AM

## 2019-06-18 NOTE — ANESTHESIA PREPROCEDURE EVALUATION
Anesthesia Evaluation     no history of anesthetic complications:  NPO Solid Status: > 8 hours  NPO Liquid Status: > 8 hours           Airway   Mallampati: II  TM distance: >3 FB  Neck ROM: full  No difficulty expected  Dental - normal exam     Pulmonary - normal exam   (+) a smoker Former,   (-) lung cancer  Cardiovascular - normal exam        Neuro/Psych  GI/Hepatic/Renal/Endo      Musculoskeletal     (+) back pain,   Abdominal  - normal exam   Substance History   (+) alcohol use,   (-) drug use     OB/GYN          Other                      Anesthesia Plan    ASA 2     general     intravenous induction   Anesthetic plan, all risks, benefits, and alternatives have been provided, discussed and informed consent has been obtained with: patient.

## 2019-06-18 NOTE — ANESTHESIA PROCEDURE NOTES
Airway  Urgency: elective    Date/Time: 6/18/2019 7:28 AM  Airway not difficult    General Information and Staff    Patient location during procedure: OR  Anesthesiologist: Dung Freeman MD  CRNA: Armando Gomes CRNA    Indications and Patient Condition    Preoxygenated: yes  MILS not maintained throughout  Mask difficulty assessment: 0 - not attempted    Final Airway Details  Final airway type: supraglottic airway      Successful airway: unique  Size 4    Number of attempts at approach: 1    Additional Comments  Atraumatic LMA placement, dentition unchanged.

## 2019-06-18 NOTE — ANESTHESIA POSTPROCEDURE EVALUATION
Patient: Carlo Wellington    Procedure Summary     Date:  06/18/19 Room / Location:   [facility] OR 01 /  [facility]    Anesthesia Start:  0728 Anesthesia Stop:  0953    Procedures:       SUBTALAR ARTHROEREISIS, GASTROC RECESSION (Right Ankle)      BUNIONECTOMY (Right Foot) Diagnosis:       Bunion of right foot      Pes planus of right foot      Instability of right foot joint      Right foot pain      (Bunion of right foot [M21.611])      (Pes planus of right foot [M21.41])      (Instability of right foot joint [M25.374])      (Right foot pain [M79.671])    Surgeon:  Sybil Mortensen DPM Provider:  Dung Freeman MD    Anesthesia Type:  general ASA Status:  2          Anesthesia Type: general  Last vitals  BP   112/80 (06/18/19 1024)   Temp   97 °F (36.1 °C) (06/18/19 1024)   Pulse   60 (06/18/19 1024)   Resp   16 (06/18/19 1024)     SpO2   95 % (06/18/19 1024)     Post Anesthesia Care and Evaluation    Patient location during evaluation: PHASE II  Patient participation: complete - patient participated  Level of consciousness: awake and alert  Pain score: 0  Pain management: adequate  Airway patency: patent  Anesthetic complications: No anesthetic complications    Cardiovascular status: acceptable  Respiratory status: acceptable  Hydration status: acceptable

## 2019-07-20 ENCOUNTER — HOSPITAL ENCOUNTER (EMERGENCY)
Facility: HOSPITAL | Age: 44
Discharge: HOME | End: 2019-07-20
Attending: EMERGENCY MEDICINE | Admitting: EMERGENCY MEDICINE

## 2019-07-20 ENCOUNTER — APPOINTMENT (OUTPATIENT)
Dept: GENERAL RADIOLOGY | Facility: HOSPITAL | Age: 44
End: 2019-07-20

## 2019-07-20 VITALS
WEIGHT: 206 LBS | RESPIRATION RATE: 18 BRPM | HEIGHT: 71 IN | HEART RATE: 55 BPM | OXYGEN SATURATION: 99 % | DIASTOLIC BLOOD PRESSURE: 83 MMHG | SYSTOLIC BLOOD PRESSURE: 123 MMHG | TEMPERATURE: 98.6 F | BODY MASS INDEX: 28.84 KG/M2

## 2019-07-20 DIAGNOSIS — M79.671 RIGHT FOOT PAIN: ICD-10-CM

## 2019-07-20 DIAGNOSIS — M79.604 ACUTE PAIN OF RIGHT LOWER EXTREMITY: ICD-10-CM
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
## 2019-07-20 PROCEDURE — 73630 X-RAY EXAM OF FOOT: CPT

## 2019-07-20 PROCEDURE — 73590 X-RAY EXAM OF LOWER LEG: CPT | Performed by: RADIOLOGY

## 2019-07-20 PROCEDURE — 73590 X-RAY EXAM OF LOWER LEG: CPT

## 2019-07-20 PROCEDURE — 73610 X-RAY EXAM OF ANKLE: CPT | Performed by: RADIOLOGY

## 2019-07-20 PROCEDURE — 73610 X-RAY EXAM OF ANKLE: CPT

## 2019-07-20 PROCEDURE — 99283 EMERGENCY DEPT VISIT LOW MDM: CPT

## 2019-07-20 PROCEDURE — 73630 X-RAY EXAM OF FOOT: CPT | Performed by: RADIOLOGY

## 2019-07-20 RX ORDER — HYDROCODONE BITARTRATE AND ACETAMINOPHEN 7.5; 325 MG/1; MG/1
1 TABLET ORAL ONCE
Status: COMPLETED | OUTPATIENT
Start: 2019-07-20 | End: 2019-07-20

## 2019-07-20 RX ADMIN — HYDROCODONE BITARTRATE AND ACETAMINOPHEN 1 TABLET: 7.5; 325 TABLET ORAL at 08:05

## 2019-08-12 ENCOUNTER — PREP FOR SURGERY (OUTPATIENT)
Dept: OTHER | Facility: HOSPITAL | Age: 44
End: 2019-08-12

## 2019-08-12 DIAGNOSIS — M24.572 EQUINUS CONTRACTURE OF LEFT ANKLE: ICD-10-CM

## 2019-08-12 DIAGNOSIS — M21.612 BUNION, LEFT: Primary | ICD-10-CM

## 2019-08-12 DIAGNOSIS — M79.672 LEFT FOOT PAIN: ICD-10-CM

## 2019-08-12 DIAGNOSIS — M25.375 INSTABILITY OF LEFT FOOT JOINT: ICD-10-CM

## 2019-08-19 ENCOUNTER — APPOINTMENT (OUTPATIENT)
Dept: PREADMISSION TESTING | Facility: HOSPITAL | Age: 44
End: 2019-08-19

## 2019-08-19 LAB
ANION GAP SERPL CALCULATED.3IONS-SCNC: 14.9 MMOL/L (ref 5–15)
BUN BLD-MCNC: 10 MG/DL (ref 6–20)
BUN/CREAT SERPL: 9.2 (ref 7–25)
CALCIUM SPEC-SCNC: 10 MG/DL (ref 8.6–10.5)
CHLORIDE SERPL-SCNC: 99 MMOL/L (ref 98–107)
CO2 SERPL-SCNC: 26.1 MMOL/L (ref 22–29)
CREAT BLD-MCNC: 1.09 MG/DL (ref 0.76–1.27)
DEPRECATED RDW RBC AUTO: 48 FL (ref 37–54)
ERYTHROCYTE [DISTWIDTH] IN BLOOD BY AUTOMATED COUNT: 15.4 % (ref 12.3–15.4)
GFR SERPL CREATININE-BSD FRML MDRD: 90 ML/MIN/1.73
GLUCOSE BLD-MCNC: 88 MG/DL (ref 65–99)
HCT VFR BLD AUTO: 46.5 % (ref 37.5–51)
HGB BLD-MCNC: 15.3 G/DL (ref 13–17.7)
MCH RBC QN AUTO: 27.9 PG (ref 26.6–33)
MCHC RBC AUTO-ENTMCNC: 32.9 G/DL (ref 31.5–35.7)
MCV RBC AUTO: 84.9 FL (ref 79–97)
PLATELET # BLD AUTO: 199 10*3/MM3 (ref 140–450)
PMV BLD AUTO: 11.9 FL (ref 6–12)
POTASSIUM BLD-SCNC: 4.3 MMOL/L (ref 3.5–5.2)
RBC # BLD AUTO: 5.48 10*6/MM3 (ref 4.14–5.8)
SODIUM BLD-SCNC: 140 MMOL/L (ref 136–145)
WBC NRBC COR # BLD: 6.45 10*3/MM3 (ref 3.4–10.8)

## 2019-08-19 PROCEDURE — 36415 COLL VENOUS BLD VENIPUNCTURE: CPT

## 2019-08-19 PROCEDURE — 85027 COMPLETE CBC AUTOMATED: CPT | Performed by: ANESTHESIOLOGY

## 2019-08-19 PROCEDURE — 80048 BASIC METABOLIC PNL TOTAL CA: CPT | Performed by: ANESTHESIOLOGY

## 2019-08-19 NOTE — DISCHARGE INSTRUCTIONS

## 2019-08-20 ENCOUNTER — APPOINTMENT (OUTPATIENT)
Dept: GENERAL RADIOLOGY | Facility: HOSPITAL | Age: 44
End: 2019-08-20

## 2019-08-20 ENCOUNTER — HOSPITAL ENCOUNTER (OUTPATIENT)
Facility: HOSPITAL | Age: 44
Setting detail: HOSPITAL OUTPATIENT SURGERY
Discharge: HOME | End: 2019-08-20
Attending: PODIATRIST | Admitting: PODIATRIST

## 2019-08-20 ENCOUNTER — ANESTHESIA (OUTPATIENT)
Dept: PERIOP | Facility: HOSPITAL | Age: 44
End: 2019-08-20

## 2019-08-20 ENCOUNTER — ANESTHESIA EVENT (OUTPATIENT)
Dept: PERIOP | Facility: HOSPITAL | Age: 44
End: 2019-08-20

## 2019-08-20 VITALS
BODY MASS INDEX: 27.47 KG/M2 | TEMPERATURE: 98.7 F | OXYGEN SATURATION: 97 % | WEIGHT: 196.2 LBS | SYSTOLIC BLOOD PRESSURE: 126 MMHG | RESPIRATION RATE: 15 BRPM | HEART RATE: 78 BPM | DIASTOLIC BLOOD PRESSURE: 78 MMHG | HEIGHT: 71 IN

## 2019-08-20 DIAGNOSIS — M24.572 EQUINUS CONTRACTURE OF LEFT ANKLE: ICD-10-CM

## 2019-08-20 DIAGNOSIS — M79.672 LEFT FOOT PAIN: ICD-10-CM

## 2019-08-20 DIAGNOSIS — M21.612 BUNION, LEFT: ICD-10-CM

## 2019-08-20 DIAGNOSIS — M25.375 INSTABILITY OF LEFT FOOT JOINT: ICD-10-CM

## 2019-08-20 PROCEDURE — C1713 ANCHOR/SCREW BN/BN,TIS/BN: HCPCS | Performed by: PODIATRIST

## 2019-08-20 PROCEDURE — 25010000003 CEFAZOLIN PER 500 MG: Performed by: PODIATRIST

## 2019-08-20 PROCEDURE — C1776 JOINT DEVICE (IMPLANTABLE): HCPCS | Performed by: PODIATRIST

## 2019-08-20 PROCEDURE — 25010000002 FENTANYL CITRATE (PF) 100 MCG/2ML SOLUTION: Performed by: NURSE ANESTHETIST, CERTIFIED REGISTERED

## 2019-08-20 PROCEDURE — 76000 FLUOROSCOPY <1 HR PHYS/QHP: CPT

## 2019-08-20 PROCEDURE — 25010000002 PROPOFOL 10 MG/ML EMULSION: Performed by: NURSE ANESTHETIST, CERTIFIED REGISTERED

## 2019-08-20 PROCEDURE — 76000 FLUOROSCOPY <1 HR PHYS/QHP: CPT | Performed by: RADIOLOGY

## 2019-08-20 PROCEDURE — 25010000003 LIDOCAINE 1 % SOLUTION: Performed by: PODIATRIST

## 2019-08-20 PROCEDURE — 25010000002 MIDAZOLAM PER 1 MG: Performed by: NURSE ANESTHETIST, CERTIFIED REGISTERED

## 2019-08-20 PROCEDURE — 25010000002 HYDROMORPHONE PER 4 MG: Performed by: NURSE ANESTHETIST, CERTIFIED REGISTERED

## 2019-08-20 PROCEDURE — 25010000002 ONDANSETRON PER 1 MG: Performed by: NURSE ANESTHETIST, CERTIFIED REGISTERED

## 2019-08-20 DEVICE — CANNULATED SCREW
Type: IMPLANTABLE DEVICE | Site: FOOT | Status: FUNCTIONAL
Brand: ASNIS

## 2019-08-20 DEVICE — ARTHROERESIS IMPLANT
Type: IMPLANTABLE DEVICE | Site: FOOT | Status: FUNCTIONAL
Brand: SUBFIX

## 2019-08-20 DEVICE — XPRESS STAPLE (10 X 10 X 10MM)
Type: IMPLANTABLE DEVICE | Status: FUNCTIONAL
Brand: EASYCLIP

## 2019-08-20 RX ORDER — MIDAZOLAM HYDROCHLORIDE 1 MG/ML
1 INJECTION INTRAMUSCULAR; INTRAVENOUS
Status: DISCONTINUED | OUTPATIENT
Start: 2019-08-20 | End: 2019-08-20 | Stop reason: HOSPADM

## 2019-08-20 RX ORDER — ONDANSETRON 2 MG/ML
4 INJECTION INTRAMUSCULAR; INTRAVENOUS AS NEEDED
Status: DISCONTINUED | OUTPATIENT
Start: 2019-08-20 | End: 2019-08-20 | Stop reason: HOSPADM

## 2019-08-20 RX ORDER — CEPHALEXIN 500 MG/1
500 CAPSULE ORAL 3 TIMES DAILY
Qty: 21 CAPSULE | Refills: 0 | Status: SHIPPED | OUTPATIENT
Start: 2019-08-20 | End: 2019-08-27

## 2019-08-20 RX ORDER — LIDOCAINE HYDROCHLORIDE 20 MG/ML
INJECTION, SOLUTION INFILTRATION; PERINEURAL AS NEEDED
Status: DISCONTINUED | OUTPATIENT
Start: 2019-08-20 | End: 2019-08-20 | Stop reason: SURG

## 2019-08-20 RX ORDER — DICLOFENAC SODIUM 75 MG/1
75 TABLET, DELAYED RELEASE ORAL 2 TIMES DAILY
COMMUNITY
End: 2020-05-29

## 2019-08-20 RX ORDER — MIDAZOLAM HYDROCHLORIDE 1 MG/ML
INJECTION INTRAMUSCULAR; INTRAVENOUS AS NEEDED
Status: DISCONTINUED | OUTPATIENT
Start: 2019-08-20 | End: 2019-08-20 | Stop reason: SURG

## 2019-08-20 RX ORDER — MAGNESIUM HYDROXIDE 1200 MG/15ML
LIQUID ORAL AS NEEDED
Status: DISCONTINUED | OUTPATIENT
Start: 2019-08-20 | End: 2019-08-20 | Stop reason: HOSPADM

## 2019-08-20 RX ORDER — MEPERIDINE HYDROCHLORIDE 25 MG/ML
12.5 INJECTION INTRAMUSCULAR; INTRAVENOUS; SUBCUTANEOUS
Status: DISCONTINUED | OUTPATIENT
Start: 2019-08-20 | End: 2019-08-20 | Stop reason: HOSPADM

## 2019-08-20 RX ORDER — FENTANYL CITRATE 50 UG/ML
50 INJECTION, SOLUTION INTRAMUSCULAR; INTRAVENOUS
Status: DISCONTINUED | OUTPATIENT
Start: 2019-08-20 | End: 2019-08-20 | Stop reason: HOSPADM

## 2019-08-20 RX ORDER — MELOXICAM 15 MG/1
15 TABLET ORAL DAILY
COMMUNITY
End: 2020-05-29

## 2019-08-20 RX ORDER — FENTANYL CITRATE 50 UG/ML
INJECTION, SOLUTION INTRAMUSCULAR; INTRAVENOUS AS NEEDED
Status: DISCONTINUED | OUTPATIENT
Start: 2019-08-20 | End: 2019-08-20 | Stop reason: SURG

## 2019-08-20 RX ORDER — HYDROMORPHONE HCL 110MG/55ML
PATIENT CONTROLLED ANALGESIA SYRINGE INTRAVENOUS AS NEEDED
Status: DISCONTINUED | OUTPATIENT
Start: 2019-08-20 | End: 2019-08-20 | Stop reason: SURG

## 2019-08-20 RX ORDER — OXYCODONE HYDROCHLORIDE AND ACETAMINOPHEN 5; 325 MG/1; MG/1
1 TABLET ORAL EVERY 4 HOURS PRN
Qty: 60 TABLET | Refills: 0 | Status: SHIPPED | OUTPATIENT
Start: 2019-08-20 | End: 2019-08-20 | Stop reason: SDUPTHER

## 2019-08-20 RX ORDER — OXYCODONE HYDROCHLORIDE AND ACETAMINOPHEN 5; 325 MG/1; MG/1
1 TABLET ORAL ONCE AS NEEDED
Status: DISCONTINUED | OUTPATIENT
Start: 2019-08-20 | End: 2019-08-20 | Stop reason: HOSPADM

## 2019-08-20 RX ORDER — BUPIVACAINE HYDROCHLORIDE 5 MG/ML
INJECTION, SOLUTION PERINEURAL AS NEEDED
Status: DISCONTINUED | OUTPATIENT
Start: 2019-08-20 | End: 2019-08-20 | Stop reason: HOSPADM

## 2019-08-20 RX ORDER — SODIUM CHLORIDE 0.9 % (FLUSH) 0.9 %
3-10 SYRINGE (ML) INJECTION AS NEEDED
Status: DISCONTINUED | OUTPATIENT
Start: 2019-08-20 | End: 2019-08-20 | Stop reason: HOSPADM

## 2019-08-20 RX ORDER — PROPOFOL 10 MG/ML
VIAL (ML) INTRAVENOUS AS NEEDED
Status: DISCONTINUED | OUTPATIENT
Start: 2019-08-20 | End: 2019-08-20 | Stop reason: SURG

## 2019-08-20 RX ORDER — MIDAZOLAM HYDROCHLORIDE 1 MG/ML
2 INJECTION INTRAMUSCULAR; INTRAVENOUS
Status: DISCONTINUED | OUTPATIENT
Start: 2019-08-20 | End: 2019-08-20 | Stop reason: HOSPADM

## 2019-08-20 RX ORDER — LIDOCAINE HYDROCHLORIDE 10 MG/ML
INJECTION, SOLUTION INFILTRATION; PERINEURAL AS NEEDED
Status: DISCONTINUED | OUTPATIENT
Start: 2019-08-20 | End: 2019-08-20 | Stop reason: HOSPADM

## 2019-08-20 RX ORDER — ONDANSETRON 2 MG/ML
INJECTION INTRAMUSCULAR; INTRAVENOUS AS NEEDED
Status: DISCONTINUED | OUTPATIENT
Start: 2019-08-20 | End: 2019-08-20 | Stop reason: SURG

## 2019-08-20 RX ORDER — FAMOTIDINE 10 MG/ML
INJECTION, SOLUTION INTRAVENOUS AS NEEDED
Status: DISCONTINUED | OUTPATIENT
Start: 2019-08-20 | End: 2019-08-20 | Stop reason: SURG

## 2019-08-20 RX ORDER — SODIUM CHLORIDE 0.9 % (FLUSH) 0.9 %
3 SYRINGE (ML) INJECTION EVERY 12 HOURS SCHEDULED
Status: DISCONTINUED | OUTPATIENT
Start: 2019-08-20 | End: 2019-08-20 | Stop reason: HOSPADM

## 2019-08-20 RX ORDER — ESCITALOPRAM OXALATE 10 MG/1
10 TABLET ORAL DAILY
Status: ON HOLD | COMMUNITY
End: 2020-06-28

## 2019-08-20 RX ORDER — SODIUM CHLORIDE, SODIUM LACTATE, POTASSIUM CHLORIDE, CALCIUM CHLORIDE 600; 310; 30; 20 MG/100ML; MG/100ML; MG/100ML; MG/100ML
125 INJECTION, SOLUTION INTRAVENOUS CONTINUOUS
Status: DISCONTINUED | OUTPATIENT
Start: 2019-08-20 | End: 2019-08-20 | Stop reason: HOSPADM

## 2019-08-20 RX ORDER — ONDANSETRON 4 MG/1
4 TABLET, FILM COATED ORAL EVERY 8 HOURS PRN
Qty: 15 TABLET | Refills: 0 | Status: SHIPPED | OUTPATIENT
Start: 2019-08-20 | End: 2020-04-26

## 2019-08-20 RX ORDER — IPRATROPIUM BROMIDE AND ALBUTEROL SULFATE 2.5; .5 MG/3ML; MG/3ML
3 SOLUTION RESPIRATORY (INHALATION) ONCE AS NEEDED
Status: DISCONTINUED | OUTPATIENT
Start: 2019-08-20 | End: 2019-08-20 | Stop reason: HOSPADM

## 2019-08-20 RX ADMIN — HYDROMORPHONE HYDROCHLORIDE 0.5 MG: 2 INJECTION, SOLUTION INTRAMUSCULAR; INTRAVENOUS; SUBCUTANEOUS at 09:55

## 2019-08-20 RX ADMIN — FAMOTIDINE 20 MG: 10 INJECTION, SOLUTION INTRAVENOUS at 07:48

## 2019-08-20 RX ADMIN — FENTANYL CITRATE 100 MCG: 50 INJECTION INTRAMUSCULAR; INTRAVENOUS at 07:44

## 2019-08-20 RX ADMIN — HYDROMORPHONE HYDROCHLORIDE 1 MG: 2 INJECTION, SOLUTION INTRAMUSCULAR; INTRAVENOUS; SUBCUTANEOUS at 09:04

## 2019-08-20 RX ADMIN — SODIUM CHLORIDE, POTASSIUM CHLORIDE, SODIUM LACTATE AND CALCIUM CHLORIDE: 600; 310; 30; 20 INJECTION, SOLUTION INTRAVENOUS at 07:44

## 2019-08-20 RX ADMIN — MIDAZOLAM HYDROCHLORIDE 2 MG: 1 INJECTION, SOLUTION INTRAMUSCULAR; INTRAVENOUS at 07:44

## 2019-08-20 RX ADMIN — ONDANSETRON 4 MG: 2 INJECTION, SOLUTION INTRAMUSCULAR; INTRAVENOUS at 07:48

## 2019-08-20 RX ADMIN — LIDOCAINE HYDROCHLORIDE 60 MG: 20 INJECTION, SOLUTION INFILTRATION; PERINEURAL at 07:48

## 2019-08-20 RX ADMIN — CEFAZOLIN 2 G: 1 INJECTION, POWDER, FOR SOLUTION INTRAMUSCULAR; INTRAVENOUS; PARENTERAL at 07:44

## 2019-08-20 RX ADMIN — PROPOFOL 150 MG: 10 INJECTION, EMULSION INTRAVENOUS at 07:48

## 2019-08-20 RX ADMIN — SODIUM CHLORIDE, POTASSIUM CHLORIDE, SODIUM LACTATE AND CALCIUM CHLORIDE: 600; 310; 30; 20 INJECTION, SOLUTION INTRAVENOUS at 09:55

## 2019-08-20 RX ADMIN — HYDROMORPHONE HYDROCHLORIDE 0.5 MG: 2 INJECTION, SOLUTION INTRAMUSCULAR; INTRAVENOUS; SUBCUTANEOUS at 09:46

## 2019-08-20 NOTE — H&P
PODIATRIC SURGERY  History and Physical      Principal problem: left foot bunion    Subjective .     History of present illness:    Mr. Carlo Wellington is a 43 y.o. years old male with so significant past medical history. He complains of left flat foot and bunion pain. He has continued pain despite attempting multiple conservative treatments including shoe gear modifications, injections, padding, and medications. He presents today for surgical treatment. No changes since last seen as outpatient. He did have surgical correction for the same complaints to his right foot a few months ago and did quit nicotine products at that time confirmed with serum conitine and nicotine. He relates that he has continued his smoking cessation. He did not have any delay in healing of his right foot surgery.      History taken from: patient    Case was discussed with patient    Review of Systems    Constitutional: no fever, chills and night sweats. No appetite change or unexpected weight change. No fatigue.  Eyes: no eye drainage, itching or redness.  HEENT: no mouth sores, dysphagia or nose bleed.  Respiratory: no for shortness of breath, cough or production of sputum.  Cardiovascular: no chest pain, no palpitations, no orthopnea.  Gastrointestinal: no nausea, vomiting or diarrhea. No abdominal pain, hematemesis or rectal bleeding.  Genitourinary: no dysuria or polyuria.  Hematologic/lymphatic: no lymph node abnormalities, no easy bruising or easy bleeding.  Musculoskeletal: no muscle or joint pain.  Skin: No rash and no itching.  Neurological: no loss of consciousness, no seizure, no headache.  Behavioral/Psych: no depression or suicidal ideation.  Endocrine: no hot flashes.  Immunologic: negative.    Past Medical History    Past Medical History:   Diagnosis Date   • Back pain    • Bunion    • Depression    • Knee pain, left    • Lumbar disc herniation     X 2       Past Surgical History    Past Surgical History:   Procedure  Laterality Date   • BUNIONECTOMY Right 6/18/2019    Procedure: BUNIONECTOMY;  Surgeon: Sybil Mortensen DPM;  Location: [facility] OR;  Service: Podiatry   • SUBTALAR ARTHRODESIS Right 6/18/2019    Procedure: SUBTALAR ARTHROEREISIS, GASTROC RECESSION;  Surgeon: Sybil Mortensen DPM;  Location: [facility];  Service: Podiatry       Family History    No family history on file.    Social History    Social History     Tobacco Use   • Smoking status: Current Every Day Smoker     Packs/day: 1.00     Types: Electronic Cigarette   • Smokeless tobacco: Never Used   Substance Use Topics   • Alcohol use: No     Frequency: Never     Comment: OCASSIONALLY   • Drug use: No       Allergies    Patient has no known allergies.    Objective     There were no vitals taken for this visit.           No intake or output data in the 24 hours ending 08/20/19 0709      Physical Exam:     General Appearance:    Alert, cooperative, in no acute distress   Head:    Normocephalic, without obvious abnormality, atraumatic    Heart:    Regular rhythm and normal rate     Chest Wall:    No abnormalities observed   Abdomen:    Soft non-tender, non-distended, no guarding, no rebound                tenderness   Extremities:   Moves all extremities well, no edema, no cyanosis, no             Redness. Left ankle equinus, subtalar joint instability, severe HAV.   Pulses:   Pulses palpable and equal bilaterally   Skin:   No bleeding, bruising or rash   Lymph nodes:   No palpable adenopathy               Results from last 7 days   Lab Units 08/19/19  1020   WBC 10*3/mm3 6.45     Lab Results   Component Value Date    NEUTROABS 7.29 (H) 06/05/2019       Results from last 7 days   Lab Units 08/19/19  1020   CREATININE mg/dL 1.09             Imaging Results (last 24 hours)     ** No results found for the last 24 hours. **            Results Review:    I have personally reviewed laboratory data, culture results, radiology studies and antimicrobial  therapy.          PROBLEM LIST:    Patient Active Problem List   Diagnosis   • Herniation of intervertebral disc of lumbar spine   • Bunion of right foot   • Pes planus of right foot   • Instability of right foot joint   • Right foot pain   • Bunion, left   • Equinus contracture of left ankle   • Instability of left foot joint   • Left foot pain       Assessment/Plan     ASSESSMENT:    43 year old male with left HAV, equinus, and subtalar joint instability    PLAN:    -Proceeding with surgical treatment: bunionectomy, gastroc recession, and subtalar arthroeresis after thorough discussion of all alternatives, risks, and benefits.      Patients findings and recommendations were discussed with patient    Code Status:   There are no questions and answers to display.       Sybil Mortensen DPM  08/20/19  7:09 AM

## 2019-08-20 NOTE — OP NOTE
Operative Progress Note:     Surgeon and Assistant: Dr. Sybil Mortensen DPM     Pre-Operative Diagnosis:   1.  Left ankle joint contracture  2.  Left subtalar joint instability  3.  Left foot bunion     Post-Operative Diagnosis:   1.  Left ankle joint contracture  2.  Left subtalar joint instability  3.  Left foot bunion     Procedure(s):    1.  Open gastrocnemius recession. CPT 40984  2.  Open left subtalar joint stabilization. CPT 53437  3.  Bunionectomy by double osteotomy. CPT 88399     Type of Anesthesia Administered: General with ankle block consisting of 10cc 50:50 mix 1% lidocaine and 0.5% marcaine     Estimated Blood Loss: 4mL     Hemostasis: Thigh tourniquet inflated to 300mmHg     Blood Products: None     Specimen Obtained/Removed: None     Complication(s):  None     Graft/Implant/Prosthetics/Implanted Device/Transplants: Juju Subfix subtalar joint implant. Dunn Loring 3.0mm Asnis screws x2. Juju staple size 10mm.     Indication: 43-year-old male with no significant past medical history of persistent left foot pain associated with flatfoot and bunion for many years.  He has continued pain despite attempting multiple conservative treatments including shoe gear modifications, injections, padding, and medications. He elects to proceed today with surgery after thorough discussion of all risks, alternatives, benefits. He did have the same surgical procedures on the right foot a few months ago and healed without delay and is now weight bearing as tolerated to that foot. He reports continued smoking cessation.     Findings: Equinus, subtalar joint instability, bunion     Operative Report:  Patient was identified in the preoperative holding area, formal consent was signed in the Left lower extremity was marked correct site.  Patient was brought to the operating suite and placed the operating table in the supine position.  Following time out patient underwent anesthesia as dictated above.  A well-padded Left thigh  tourniquet was applied.  The Left foot was then scrubbed prepped and draped the usual sterile manner.  Preoperative timeout was performed. Esmarch exsanguination was utilized and the tourniquet was inflated.      Attention was focused to the posterior medial aspect of the left calf.  A longitudinal incision was made 4 fingerbreadths distal to the distal border of the gastrocnemius muscle belly.  Blunt dissection using a mosquito hemostat was utilized until the aponeurosis was identified.  A 15 blade was used to transect the aponeurosis until underlying soleus muscle was identified.  There was noted to be an increase in ankle dorsiflexion at this time.  This incision was copiously irrigated with sterile saline and closed in layers using 3-0 Monocryl and 3-0 nylon.     Attention was then focused to the lateral aspect of the subtalar joint at the location of the sinus tarsi.  Curvilinear incision was made in line with the relaxed skin tension lines.  Blunt dissection was carried out into the sinus tarsi.  The guidewire was inserted and noted to be within the sinus tarsi on fluoroscopy.  Juju subfix sizers were introduced into the subtalar joint and inversion and eversion were tested.  The size of 11.5 mm implant was noted to have an appropriate reduction in unstable joint eversion.  The sizer was removed and the implant was screwed into place over the guidewire.  Fluoroscopy was utilized to ensure correct placement of the implant.  Guidewire was removed. This incision was closed in layers using 3-0 Monocryl and 3-0 nylon.     Attention was then noted to the first metatarsal phalangeal joint and a longitudinal incision was made at the dorsal medial aspect extending from the mid metatarsal shaft to the hallux interphalangeal joint.  Layered dissection took place using care to retract all neurovascular structures, using Bovie hemostasis as needed.  The extensor hallucis longus tendon was identified periosteal incision  was made medial to the tendon.  Inverted L capsulorrhaphy was then performed at the metatarsophalangeal joint.  Dissection was carried out at the lateral aspect of the joint where the adductor tendons were resected from the lateral aspect of the proximal phalanx using 15 blade.  A sagittal saw was utilized to remove the dorsal medial eminence of the metatarsal head.  A long-arm Pete osteotomy was then performed with the apex at the central metatarsal head.  The capital fragment was moved laterally and held in place using K wires and fixated using two 3.0mm screws.  Decrease in intermetatarsal angle as well as appropriate screw position was confirmed using fluoroscopy.  Attention was then focused to the hallux proximal phalanx.  Sagittal saw was utilized to make an Akin osteotomy in order to correct the hallux abductus angle.  This was fixated using an 10 mm Sunfun Info easy clip staple.  Rongeur and power rachael was utilized to remove the overhanging bone from the medial metatarsal shaft.  The surgical site was copiously irrigated with sterile saline.  Capsule was closed with 2-0 Monocryl.  Skin and subcutaneous tissue closed with 3-0 Monocryl followed by 5-0 Monocryl.  Steri strips were applied.  The tourniquet was deflated and there was noted to be brisk capillary refill to all digits.  A dry sterile dressing was applied.  The patient was extubated and transferred to PACU with vital signs stable.  He will be discharged home today with instructions to be heel touch weightbearing only to the right lower extremity in a controlled ankle motion boot and to leave dressings clean dry and intact.                       Electronically Signed by: Sybil Mortensen DPM          Dictated Utilizing Dragon Dictation

## 2019-08-20 NOTE — ANESTHESIA POSTPROCEDURE EVALUATION
Patient: Carlo Wellington    Procedure Summary     Date:  08/20/19 Room / Location:  [facility] OR 01 /  [facility] OR    Anesthesia Start:  0739 Anesthesia Stop:  0958    Procedures:       BUNIONECTOMY (Left Foot)      RECESSION GASTROCNEMIUS (Left )      SUBTALAR ARTHROESIS (Left Ankle) Diagnosis:       Bunion, left      Equinus contracture of left ankle      Instability of left foot joint      Left foot pain      (Bunion, left [M21.612])      (Equinus contracture of left ankle [M24.572])      (Instability of left foot joint [M25.375])      (Left foot pain [M79.672])    Surgeon:  Sybil Mortensen DPM Provider:  Dung Freeman MD    Anesthesia Type:  general ASA Status:  2          Anesthesia Type: general  Last vitals  BP   134/90 (08/20/19 1035)   Temp   98.7 °F (37.1 °C) (08/20/19 1035)   Pulse   81 (08/20/19 1035)   Resp   14 (08/20/19 1035)     SpO2   96 % (08/20/19 1035)     Post Anesthesia Care and Evaluation    Patient location during evaluation: PHASE II  Patient participation: complete - patient participated  Level of consciousness: awake and alert  Pain score: 1  Pain management: adequate  Airway patency: patent  Anesthetic complications: No anesthetic complications  PONV Status: controlled  Cardiovascular status: acceptable  Respiratory status: acceptable  Hydration status: acceptable

## 2019-08-20 NOTE — ANESTHESIA PROCEDURE NOTES
Airway  Urgency: elective    Date/Time: 8/20/2019 7:48 AM  Airway not difficult    General Information and Staff    Patient location during procedure: OR  Anesthesiologist: Dung Freeman MD  CRNA: Dinah Hutchins CRNA    Indications and Patient Condition  Indications for airway management: airway protection    Preoxygenated: yes  Mask difficulty assessment: 0 - not attempted    Final Airway Details  Final airway type: supraglottic airway      Successful airway: classic  Size 3    Number of attempts at approach: 1    Additional Comments  LMA placed with no trauma noted. Patient tolerated well. Good seal. Secured.

## 2019-10-14 ENCOUNTER — LAB (OUTPATIENT)
Dept: LAB | Facility: HOSPITAL | Age: 44
End: 2019-10-14

## 2019-10-14 DIAGNOSIS — F11.10 OPIOID ABUSE (HCC): ICD-10-CM

## 2019-10-14 PROCEDURE — G0483 DRUG TEST DEF 22+ CLASSES: HCPCS

## 2019-10-14 PROCEDURE — 80307 DRUG TEST PRSMV CHEM ANLYZR: CPT

## 2019-10-15 ENCOUNTER — TRANSCRIBE ORDERS (OUTPATIENT)
Dept: ADMINISTRATIVE | Facility: HOSPITAL | Age: 44
End: 2019-10-15

## 2019-10-15 DIAGNOSIS — F11.10 OPIOID ABUSE (HCC): Primary | ICD-10-CM

## 2019-10-20 LAB — CONV REPORT SUMMARY: NORMAL

## 2019-11-05 ENCOUNTER — PREP FOR SURGERY (OUTPATIENT)
Dept: OTHER | Facility: HOSPITAL | Age: 44
End: 2019-11-05

## 2019-11-05 DIAGNOSIS — M47.816 LUMBAR SPONDYLOSIS: Primary | ICD-10-CM

## 2019-11-05 NOTE — H&P (VIEW-ONLY)
[id number]  Carlo Wellington  male  1975  Augustine Houston, DO  11/5/2019  PATIENT NAME: Carlo Wellington      BP: ()/()   Arterial Line BP: ()/()     Past Medical History:   Diagnosis Date   • Back pain    • Bunion    • Depression    • Knee pain, left    • Lumbar disc herniation     X 2       Past Surgical History:   Procedure Laterality Date   • BUNIONECTOMY Right 6/18/2019    Procedure: BUNIONECTOMY;  Surgeon: Sybil Mortensen DPM;  Location: [facility] OR;  Service: Podiatry   • BUNIONECTOMY Left 8/20/2019    Procedure: BUNIONECTOMY;  Surgeon: Sybil Mortensen DPM;  Location: [facility] OR;  Service: Podiatry   • GASTROCNEMIUS RECESSION Left 8/20/2019    Procedure: RECESSION GASTROCNEMIUS;  Surgeon: Sybil Mortensen DPM;  Location: [facility] OR;  Service: Podiatry   • SUBTALAR ARTHRODESIS Right 6/18/2019    Procedure: SUBTALAR ARTHROEREISIS, GASTROC RECESSION;  Surgeon: Sybil Mortensen DPM;  Location: [facility] OR;  Service: Podiatry   • SUBTALAR ARTHRODESIS Left 8/20/2019    Procedure: SUBTALAR ARTHROESIS;  Surgeon: Sybil Mortensen DPM;  Location: [facility] OR;  Service: Podiatry       Social History     Socioeconomic History   • Marital status: Single     Spouse name: Not on file   • Number of children: Not on file   • Years of education: Not on file   • Highest education level: Not on file   Tobacco Use   • Smoking status: Current Every Day Smoker     Packs/day: 1.00     Years: 20.00     Pack years: 20.00     Types: Electronic Cigarette, Cigarettes   • Smokeless tobacco: Never Used   Substance and Sexual Activity   • Alcohol use: Yes     Frequency: Never     Comment: OCASSIONALLY   • Drug use: No   • Sexual activity: Defer       No family history on file.      (Not in a hospital admission)      Review of Systems/Physical Exam:   Within Normal Limits Abnormal   Mental Status [x]    []     Head, Neck, and Throat [x]   []     Chest/Lungs [x]   []     Cardiovascular [x]   []     Abdomen [x]   []     Extremities [x]    []     Neurologic [x]   []     Other         There were no vitals filed for this visit.    Review of Systems - as previously noted      Diagnosis: M47.816    Plan: LUMBAR MEDIAL BRANCH BLOCK RIGHT 3 LEVEL L3/4, L4/5, L5/S1 (Right)       STATEMENT AS TO REASON OF PLANNED OPERATION:  [x]   H&P revealed no contraindication to planned surgery. (Check if correct).    [x]   Labs (if ordered) have been reviewed and revealed no contraindications to planned surgery. (Check if correct).      Augustine Houston,   11/5/2019

## 2019-11-05 NOTE — H&P
"Acute Care - Occupational Therapy Treatment Note  [facility]     Patient Name: Lisa Calloway  : 1937  MRN: [id number]  Today's Date: 3/20/2017  Onset of Illness/Injury or Date of Surgery Date: 17  Date of Referral to OT: 17  Referring Physician: Brody      Admit Date: 3/17/2017    Visit Dx:     ICD-10-CM ICD-9-CM   1. Impaired functional mobility, balance, gait, and endurance Z74.09 V49.89   2. Acquired spondylolisthesis M43.10 738.4   3. Impaired mobility and ADLs Z74.09 799.89     Patient Active Problem List   Diagnosis   • Abnormal mammogram   • Essential hypertension   • Osteoarthritis (arthritis due to wear and tear of joints)   • Raynaud disease   • Anxiety   • COPD (chronic obstructive pulmonary disease)   • GERD (gastroesophageal reflux disease)   • Hip pain   • Chronic diastolic heart failure   • Palpitations   • Polypharmacy   • Spondylolisthesis, lumbar region   • Spinal stenosis, lumbar region, with neurogenic claudication   • Spondylolisthesis, acquired   • Acquired spondylolisthesis             Adult Rehabilitation Note       17 1304 17 1025       Rehab Assessment/Intervention    Discipline occupational therapist  -ST physical therapist  -     Document Type therapy note (daily note)  -ST therapy note (daily note)  -     Subjective Information agree to therapy;complains of   \"I'm so uncomfortable how I am right now\"  - agree to therapy;complains of;pain  -JM     Patient Effort, Rehab Treatment good  -ST good  -JM     Symptoms Noted During/After Treatment other (see comments)   improved comfort  -ST dizziness;increased pain  -JM     Precautions/Limitations brace on when up;spinal precautions   LSO when OOB  -ST spinal precautions;fall precautions;brace on when up   LSO  -JM     Recorded by [ST] Tawana Rangel OTR [JM] Byron Paniagua, PT     Vital Signs    Pre Systolic BP Rehab  141  -JM     Pre Treatment Diastolic BP  67  -JM     Post Systolic BP Rehab  123  -JM " [id number]  Carlo Wellington  male  1975  Auugstine Houston, DO  11/5/2019  PATIENT NAME: Carlo Wellington      BP: ()/()   Arterial Line BP: ()/()     Past Medical History:   Diagnosis Date   • Back pain    • Bunion    • Depression    • Knee pain, left    • Lumbar disc herniation     X 2       Past Surgical History:   Procedure Laterality Date   • BUNIONECTOMY Right 6/18/2019    Procedure: BUNIONECTOMY;  Surgeon: Sybil Mortensen DPM;  Location: [facility] OR;  Service: Podiatry   • BUNIONECTOMY Left 8/20/2019    Procedure: BUNIONECTOMY;  Surgeon: Sybil Mortensen DPM;  Location: [facility] OR;  Service: Podiatry   • GASTROCNEMIUS RECESSION Left 8/20/2019    Procedure: RECESSION GASTROCNEMIUS;  Surgeon: Sybil Mortensen DPM;  Location: [facility] OR;  Service: Podiatry   • SUBTALAR ARTHRODESIS Right 6/18/2019    Procedure: SUBTALAR ARTHROEREISIS, GASTROC RECESSION;  Surgeon: Sybil Mortensen DPM;  Location: [facility] OR;  Service: Podiatry   • SUBTALAR ARTHRODESIS Left 8/20/2019    Procedure: SUBTALAR ARTHROESIS;  Surgeon: Sybil Mortensen DPM;  Location: [facility] OR;  Service: Podiatry       Social History     Socioeconomic History   • Marital status: Single     Spouse name: Not on file   • Number of children: Not on file   • Years of education: Not on file   • Highest education level: Not on file   Tobacco Use   • Smoking status: Current Every Day Smoker     Packs/day: 1.00     Years: 20.00     Pack years: 20.00     Types: Electronic Cigarette, Cigarettes   • Smokeless tobacco: Never Used   Substance and Sexual Activity   • Alcohol use: Yes     Frequency: Never     Comment: OCASSIONALLY   • Drug use: No   • Sexual activity: Defer       No family history on file.      (Not in a hospital admission)      Review of Systems/Physical Exam:   Within Normal Limits Abnormal   Mental Status [x]    []     Head, Neck, and Throat [x]   []     Chest/Lungs [x]   []     Cardiovascular [x]   []     Abdomen [x]   []     Extremities [x]        Post Treatment Diastolic BP  49  -JM     Pretreatment Heart Rate (beats/min)  105  -JM     Posttreatment Heart Rate (beats/min)  105  -JM     Recorded by  [JM] Byron Paniagua, PT     Pain Assessment    Pain Assessment 0-10  -ST 0-10  -     Pain Score 7  -ST 6  -JM     Post Pain Score 4  -ST 7  -JM     Pain Type Surgical pain  -ST Surgical pain  -JM     Pain Location Back   radiating into R hip  -ST Back  -JM     Pain Orientation  Lower  -JM     Pain Intervention(s) Repositioned;Ambulation/increased activity  -ST Medication (See MAR);Repositioned  -     Response to Interventions improved  -ST Tolerated  -     Recorded by [ST] Tawana Rangel, OTR [] Byron Paniagua, PT     Cognitive Assessment/Intervention    Current Cognitive/Communication Assessment functional  -ST functional  -     Orientation Status oriented x 4  -ST oriented x 4  -JM     Follows Commands/Answers Questions 100% of the time  -% of the time  -     Personal Safety WNL/WFL  -ST WNL/WFL  -     Personal Safety Interventions fall prevention program maintained;gait belt;nonskid shoes/slippers when out of bed  -ST      Recorded by [ST] Tawana Rangel, OTR [JM] Byron Paniagua, PT     Bed Mobility, Assessment/Treatment    Bed Mobility, Assistive Device bed rails;head of bed elevated  -ST bed rails  -     Bed Mobility, Roll Right, [place]  supervision required;verbal cues required  -     Bed Mob, Supine to Sit, [place] supervision required  -ST supervision required;verbal cues required  -     Bed Mob, Sit to Supine, [place] supervision required  -ST      Bed Mobility, Comment pt exhibits good technique w/log roll to R side and L side   -ST      Recorded by [ST] Tawana Rangel, OTR [JM] Byron Paniagua, PT     Transfer Assessment/Treatment    Transfers, Sit-Stand [place] supervision required  -ST supervision required  -     Transfers, Stand-Sit [place] supervision required  -ST supervision required  -   []     Neurologic [x]   []     Other         There were no vitals filed for this visit.    Review of Systems - as previously noted      Diagnosis: M47.816    Plan: LUMBAR MEDIAL BRANCH BLOCK RIGHT 3 LEVEL L3/4, L4/5, L5/S1 (Right)       STATEMENT AS TO REASON OF PLANNED OPERATION:  [x]   H&P revealed no contraindication to planned surgery. (Check if correct).    [x]   Labs (if ordered) have been reviewed and revealed no contraindications to planned surgery. (Check if correct).      Augustine Houston,   11/5/2019      Transfers, Sit-Stand-Sit, Assist Device rolling walker  -ST rolling walker  -     Toilet Transfer, [place] contact guard assist  -ST contact guard assist  -     Toilet Transfer, Assistive Device elevated toilet seat  -ST --   grab bar  -     Recorded by [ST] Tawana Rangel, OTR [JM] Byron Paniagua, PT     Gait Assessment/Treatment    Gait, [place] Level  contact guard assist;verbal cues required  -     Gait, Assistive Device  rolling walker  -     Gait, Distance (Feet)  --   75x2; 1 sitting rest break x2 min  -     Gait, Gait Pattern Analysis  swing-through gait  -     Gait, Gait Deviations  step length decreased  -     Recorded by  [JM] Byron Paniagua, PT     Functional Mobility    Functional Mobility- Ind. Level contact guard assist  -ST      Functional Mobility- Device rolling walker  -ST      Functional Mobility-Distance (Feet) 50  -ST      Functional Mobility- Comment into bathroom, to window, sink and back to bed   -ST      Recorded by [ST] Tawana Rangel, OTR      Toileting Assessment/Training    Toileting Assess/Train, Assistive Device raised toilet seat  -ST      Toileting Assess/Train, Position standing  -ST      Toileting Assess/Train, Indepen Level dependent (less than 25% patient effort)  -ST      Toileting Assess/Train, Comment pt dependent for toileting hygiene d/t brace and spinal precautions  -ST      Recorded by [ST] Tawana Rangel, OTR      Grooming Assessment/Training    Grooming Assess/Train, Comment SUA for hand hygiene SS   -ST      Recorded by [ST] Tawana Rangel, MEAGHANR      Motor Skills/Interventions    Additional Documentation Balance Skills Training (Group)  -ST      Recorded by [ST] Tawana Rangel, MEAGHANR      Balance Skills Training    Sitting-Level of Assistance Independent  -ST      Standing-Level of Assistance Close supervision  -ST      Static Standing Balance Support assistive device  -ST      Gait Balance-Level of Assistance Contact guard  -ST       Gait Balance Support assistive device  -ST      Recorded by [ST] Tawana Rangel, OTR      Positioning and Restraints    Pre-Treatment Position in bed  -ST in bed  -JM     Post Treatment Position bed  -ST chair  -JM     In Bed notified nsg;supine;call light within reach;encouraged to call for assist;legs elevated  -ST      In Chair  notified nsg;reclined;call light within reach;encouraged to call for assist;with nsg   LSO brace in place  -JM     Recorded by [ST] Tawana Rangel, OTR [JM] Byron Paniagua, PT       User Key  (r) = Recorded By, (t) = Taken By, (c) = Cosigned By    Initials Name Effective Dates     Tawana Rangel, OTR 02/20/17 -     HENRY Paniagua, PT 06/19/15 -                 OT Goals       03/20/17 1303 03/18/17 1519       Bed Mobility OT LTG    Bed Mobility OT LTG, Date Established  03/18/17  -AC     Bed Mobility OT LTG, Time to Achieve  by discharge  -AC     Bed Mobility OT LTG, Activity Type  supine to sit/sit to supine  -AC     Bed Mobility OT LTG, [place] Level  supervision required  -AC     Bed Mobility OT LTG, Assist Device  bed rails  -AC     Bed Mobility OT LTG, Outcome goal met  -ST      Bathing OT LTG    Bathing Goal OT LTG, Date Established  03/18/17  -AC     Bathing Goal OT LTG, Time to Achieve  by discharge  -AC     Bathing Goal OT LTG, Activity Type  simulates;lower body bathing  -AC     Bathing Goal OT LTG, Assist Device  sponge, long handled  -AC     Bathing Goal OT LTG, Outcome goal ongoing  -ST      LB Dressing OT LTG    LB Dressing Goal OT LTG, Date Established  03/18/17  -AC     LB Dressing Goal OT LTG, Time to Achieve  by discharge  -AC     LB Dressing Goal OT LTG, [place] Level  minimum assist (75% patient effort)  -AC     LB Dressing Goal OT LTG, Adaptive Equipment  reacher;shoe horn, long handled;sock-aid  -AC     LB Dressing Goal OT LTG, Outcome goal ongoing  -ST      Functional Mobility OT LTG    Functional Mobility Goal OT LTG, Date Established  03/18/17   -AC     Functional Mobility Goal OT LTG, Time to Achieve  by discharge  -AC     Functional Mobility Goal OT LTG, [place] Level  supervision  -AC     Functional Mobility Goal OT LTG, Assist Device  rolling walker  -AC     Functional Mobility Goal OT LTG, Distance to Achieve  in hallway;to the bathroom  -AC     Functional Mobility Goal OT LTG, Outcome goal ongoing  -ST        User Key  (r) = Recorded By, (t) = Taken By, (c) = Cosigned By    Initials Name Provider Type    AC Florinda Joshua, OT Occupational Therapist    ST Tawaan Rangel OTR Occupational Therapist          Occupational Therapy Education     Title: PT OT SLP Therapies (Active)     Topic: Occupational Therapy (Active)     Point: ADL training (Done)    Description: Instruct learner(s) on proper safety adaptation and remediation techniques during self care or transfers.   Instruct in proper use of assistive devices.    Learning Progress Summary    Learner Readiness Method Response Comment Documented by Status   Patient Acceptance E,CLARA FRIED DU pt with appropriate teach back of log roll, spinal precautions and safety demonstrated with ADL tasks ST 03/20/17 1336 Done    Acceptance CLARA ADAMS,DAVID Educated in back precautions and use of AE for LBB/LBD AC 03/18/17 1515 Done               Point: Precautions (Done)    Description: Instruct learner(s) on prescribed precautions during self-care and functional transfers.    Learning Progress Summary    Learner Readiness Method Response Comment Documented by Status   Patient Acceptance CORKY ADAMS D VU, DU pt with appropriate teach back of log roll, spinal precautions and safety demonstrated with ADL tasks ST 03/20/17 1336 Done               Point: Body mechanics (Done)    Description: Instruct learner(s) on proper positioning and spine alignment during self-care, functional mobility activities and/or exercises.    Learning Progress Summary    Learner Readiness Method Response Comment Documented by Status   Patient  Acceptance E,TB,D VU,DU pt with appropriate teach back of log roll, spinal precautions and safety demonstrated with ADL tasks ST 03/20/17 1336 Done                      User Key     Initials Effective Dates Name Provider Type Discipline     06/23/15 -  Florinda Joshua, OT Occupational Therapist OT     02/20/17 -  Tawana Rangel, OTR Occupational Therapist OT                  OT Recommendation and Plan  Anticipated Equipment Needs At Discharge:  (issued AE kit for LBB/LBD)  Anticipated Discharge Disposition: inpatient rehabilitation facility  Planned Therapy Interventions: ADL retraining, adaptive equipment training, transfer training, balance training  Therapy Frequency: daily  Plan of Care Review  Plan Of Care Reviewed With: patient  Outcome Summary/Follow up Plan: pt met bed mobility goal and progressing toward all other OT goals. pt recalls appropriately all spinal precautions and exhibits safety with ADL tasks.         Outcome Measures       03/20/17 1303 03/19/17 1005 03/18/17 1308    How much help from another person do you currently need...    Turning from your back to your side while in flat bed without using bedrails?  3  -JM 3  -JM    Moving from lying on back to sitting on the side of a flat bed without bedrails?  3  -JM 3  -JM    Moving to and from a bed to a chair (including a wheelchair)?  3  -JM 3  -JM    Standing up from a chair using your arms (e.g., wheelchair, bedside chair)?  3  -JM 3  -JM    Climbing 3-5 steps with a railing?  2  -JM 2  -JM    To walk in hospital room?  3  -JM 3  -JM    AM-PAC 6 Clicks Score  17  -JM 17  -JM    How much help from another is currently needed...    Putting on and taking off regular lower body clothing? 2  -ST      Bathing (including washing, rinsing, and drying) 2  -ST      Toileting (which includes using toilet bed pan or urinal) 2  -ST      Putting on and taking off regular upper body clothing 3  -ST      Taking care of personal grooming (such as brushing  teeth) 4  -ST      Eating meals 4  -ST      Score 17  -ST      Functional Assessment    Outcome Measure Options  AM-PAC 6 Clicks Basic Mobility (PT)  - AM-[facility] 6 Clicks Basic Mobility (PT)  -      03/18/17 1307          How much help from another is currently needed...    Putting on and taking off regular lower body clothing? 2  -AC      Bathing (including washing, rinsing, and drying) 2  -AC      Toileting (which includes using toilet bed pan or urinal) 3  -AC      Putting on and taking off regular upper body clothing 3  -AC      Taking care of personal grooming (such as brushing teeth) 4  -AC      Eating meals 4  -AC      Score 18  -AC      Functional Assessment    Outcome Measure Options AM-PAC 6 Clicks Daily Activity (OT)  -AC        User Key  (r) = Recorded By, (t) = Taken By, (c) = Cosigned By    Initials Name Provider Type    AC Florinda Joshua, OT Occupational Therapist    ST Tawana Rangel, OTR Occupational Therapist    HENRY Paniagua, PT Physical Therapist           Time Calculation:         Time Calculation- OT       03/20/17 1340          Time Calculation- OT    OT Start Time 1303  -ST      Total Timed Code Minutes- OT 26 minute(s)  -ST      OT Received On 03/20/17  -ST      OT Goal Re-Cert Due Date 03/28/17  -ST        User Key  (r) = Recorded By, (t) = Taken By, (c) = Cosigned By    Initials Name Provider Type     Tawana Rangel OTR Occupational Therapist           Therapy Charges for Today     Code Description Service Date Service Provider Modifiers Qty    [id number]  OT THERAPEUTIC ACT EA 15 MIN 3/20/2017 DANY Whelan GO 2               DANY Fiore  3/20/2017

## 2019-11-13 ENCOUNTER — ANESTHESIA EVENT (OUTPATIENT)
Dept: PERIOP | Facility: HOSPITAL | Age: 44
End: 2019-11-13

## 2019-11-13 ENCOUNTER — ANESTHESIA (OUTPATIENT)
Dept: PERIOP | Facility: HOSPITAL | Age: 44
End: 2019-11-13

## 2019-11-13 ENCOUNTER — HOSPITAL ENCOUNTER (OUTPATIENT)
Facility: HOSPITAL | Age: 44
Setting detail: HOSPITAL OUTPATIENT SURGERY
Discharge: HOME | End: 2019-11-13
Attending: ANESTHESIOLOGY | Admitting: ANESTHESIOLOGY

## 2019-11-13 ENCOUNTER — APPOINTMENT (OUTPATIENT)
Dept: GENERAL RADIOLOGY | Facility: HOSPITAL | Age: 44
End: 2019-11-13

## 2019-11-13 VITALS
BODY MASS INDEX: 29.26 KG/M2 | OXYGEN SATURATION: 99 % | TEMPERATURE: 98.3 F | SYSTOLIC BLOOD PRESSURE: 116 MMHG | HEART RATE: 70 BPM | HEIGHT: 71 IN | WEIGHT: 209 LBS | DIASTOLIC BLOOD PRESSURE: 73 MMHG | RESPIRATION RATE: 20 BRPM

## 2019-11-13 PROCEDURE — 76000 FLUOROSCOPY <1 HR PHYS/QHP: CPT

## 2019-11-13 PROCEDURE — 76000 FLUOROSCOPY <1 HR PHYS/QHP: CPT | Performed by: RADIOLOGY

## 2019-11-13 PROCEDURE — 25010000002 METHYLPREDNISOLONE PER 80 MG: Performed by: ANESTHESIOLOGY

## 2019-11-13 PROCEDURE — 25010000002 MIDAZOLAM PER 1 MG: Performed by: NURSE ANESTHETIST, CERTIFIED REGISTERED

## 2019-11-13 RX ORDER — MEPERIDINE HYDROCHLORIDE 25 MG/ML
12.5 INJECTION INTRAMUSCULAR; INTRAVENOUS; SUBCUTANEOUS
Status: DISCONTINUED | OUTPATIENT
Start: 2019-11-13 | End: 2019-11-13 | Stop reason: HOSPADM

## 2019-11-13 RX ORDER — SODIUM CHLORIDE, SODIUM LACTATE, POTASSIUM CHLORIDE, CALCIUM CHLORIDE 600; 310; 30; 20 MG/100ML; MG/100ML; MG/100ML; MG/100ML
125 INJECTION, SOLUTION INTRAVENOUS CONTINUOUS
Status: DISCONTINUED | OUTPATIENT
Start: 2019-11-13 | End: 2019-11-13 | Stop reason: HOSPADM

## 2019-11-13 RX ORDER — ONDANSETRON 2 MG/ML
4 INJECTION INTRAMUSCULAR; INTRAVENOUS AS NEEDED
Status: DISCONTINUED | OUTPATIENT
Start: 2019-11-13 | End: 2019-11-13 | Stop reason: HOSPADM

## 2019-11-13 RX ORDER — BUPIVACAINE HYDROCHLORIDE 5 MG/ML
INJECTION, SOLUTION PERINEURAL AS NEEDED
Status: DISCONTINUED | OUTPATIENT
Start: 2019-11-13 | End: 2019-11-13 | Stop reason: HOSPADM

## 2019-11-13 RX ORDER — SODIUM CHLORIDE 0.9 % (FLUSH) 0.9 %
3 SYRINGE (ML) INJECTION EVERY 12 HOURS SCHEDULED
Status: DISCONTINUED | OUTPATIENT
Start: 2019-11-13 | End: 2019-11-13 | Stop reason: HOSPADM

## 2019-11-13 RX ORDER — MIDAZOLAM HYDROCHLORIDE 1 MG/ML
INJECTION INTRAMUSCULAR; INTRAVENOUS AS NEEDED
Status: DISCONTINUED | OUTPATIENT
Start: 2019-11-13 | End: 2019-11-13 | Stop reason: SURG

## 2019-11-13 RX ORDER — METHYLPREDNISOLONE ACETATE 80 MG/ML
INJECTION, SUSPENSION INTRA-ARTICULAR; INTRALESIONAL; INTRAMUSCULAR; SOFT TISSUE AS NEEDED
Status: DISCONTINUED | OUTPATIENT
Start: 2019-11-13 | End: 2019-11-13 | Stop reason: HOSPADM

## 2019-11-13 RX ORDER — SODIUM CHLORIDE 0.9 % (FLUSH) 0.9 %
3-10 SYRINGE (ML) INJECTION AS NEEDED
Status: DISCONTINUED | OUTPATIENT
Start: 2019-11-13 | End: 2019-11-13 | Stop reason: HOSPADM

## 2019-11-13 RX ORDER — IPRATROPIUM BROMIDE AND ALBUTEROL SULFATE 2.5; .5 MG/3ML; MG/3ML
3 SOLUTION RESPIRATORY (INHALATION) ONCE AS NEEDED
Status: DISCONTINUED | OUTPATIENT
Start: 2019-11-13 | End: 2019-11-13 | Stop reason: HOSPADM

## 2019-11-13 RX ORDER — FENTANYL CITRATE 50 UG/ML
50 INJECTION, SOLUTION INTRAMUSCULAR; INTRAVENOUS
Status: DISCONTINUED | OUTPATIENT
Start: 2019-11-13 | End: 2019-11-13 | Stop reason: HOSPADM

## 2019-11-13 RX ORDER — OXYCODONE HYDROCHLORIDE AND ACETAMINOPHEN 5; 325 MG/1; MG/1
1 TABLET ORAL ONCE AS NEEDED
Status: DISCONTINUED | OUTPATIENT
Start: 2019-11-13 | End: 2019-11-13 | Stop reason: HOSPADM

## 2019-11-13 RX ADMIN — MIDAZOLAM HYDROCHLORIDE 2 MG: 1 INJECTION, SOLUTION INTRAMUSCULAR; INTRAVENOUS at 14:53

## 2019-11-13 RX ADMIN — MIDAZOLAM HYDROCHLORIDE 2 MG: 1 INJECTION, SOLUTION INTRAMUSCULAR; INTRAVENOUS at 14:57

## 2019-11-13 NOTE — OP NOTE
Informed consent was obtained after a discussion of risks, benefits and alternatives to the procedure. Patient agreed to proceed by signing the procedure consent. The patient was placed in a prone position on the fluoroscopy table. Blood pressure, heart rate, and pulse oximetry were monitored throughout the procedure.     Skin overlying the lumbar region was cleansed with ethyl alcohol and chlorahexidine solution.  Next using A/P fluoroscopic views a 25-gauge by 3 1/2 inch spinal needle was advanced to make contact with notch of the sacral ala.  Following this, under fluoroscopic views needles were advanced into the junction of the superior articular process and transverse process at the L3, L4 and L5 levels on RIGHT sides and positioned with the needle tips just posterior to a line connecting the neuroforaminal space.  Each level was then injected with a mixture of Methylprednisolone and 1cc of 0.5% Bupivicaine.  The needles were then removed intact.     The patient was monitored for adverse allergic, hemodynamic, or neurologic symptoms after the procedure. The patient tolerated the procedure well and there were no apparent complications. Vital signs remained stable throughout the procedure. The patient was taken to the recovery area where written discharge instructions for the procedure were given. The patient was discharged home.    NOTE: Universal Sterile Protocol was observed throughout the entire procedure and this patient has been educated prior to the performance of this procedure.      RIGHT SIDE    CPT 66739  [id number]  [id number]      Augustine Houston DO  11/13/19

## 2019-11-13 NOTE — DISCHARGE INSTRUCTIONS
November 20, 2019 9:00 AM    You have received an injection of local anesthetic and/or corticosteroids. The local anesthetic effect typically last 2-6 hours. It may take 3-10 days for the corticosteroids to reach optimal effect.    Please pay close attention to the following information/instructions:    You may not drive for 12 hours after your injection.    It is common to experience mild soreness at the injection site(s) for 24-48 hours. Ice is the best remedy. You may apply ice for 20 minutes at a time several times a day as needed.    Avoid heat to the injection area for 72 hours. No hot packs, saunas, or steam rooms during this time. A regular shower is OK.    You may restart your regular medication regimen, including pain medications, anti-inflammatory. Restart your blood thinner as directed by your physician.    Please remove the sterile dressing/band-aid tonight or tomorrow morning. Do not leave it on after you have taken a shower or gotten it wet.    Corticosteroid side effects can occur after this injection, but they usually resolve   after several days. These side effects can include flushing, hot flashes, mild palpitations, insomnia, water retention, feeling anxious/restless, or headaches.    While it is extremely rare to get an infection after a spinal procedure, please call the office if you develop any signs of infection. These signs include fevers/chills, severely increased pain, redness at the injections site, or any drainage from the injection site.    Remember that the corticosteroid benefit (long-term pain relief) from an injection can take as long as 10 days to occur. You may have a period of slightly increased pain after your injection before the cortisone takes effect. In some cases the local anesthetic will provide the majority of your injection. Please note this relief as well.     You may resume all of your normal daily activities 24 hours after your injection.    It is OK to restart your  exercise or physical therapy program as soon as you feel comfortable doing so.    Please call our office if you do not know or have your follow-up appointment date.    Please note your pain throughout your post-injection period.

## 2019-11-13 NOTE — ANESTHESIA POSTPROCEDURE EVALUATION
Patient: Carlo Wellington    Procedure Summary     Date:  11/13/19 Room / Location:  [facility] OR 08 /  [facility] OR    Anesthesia Start:  1453 Anesthesia Stop:  1501    Procedure:  LUMBAR MEDIAL BRANCH BLOCK RIGHT 3 LEVEL L3/4, L4/5, L5/S1 (Right ) Diagnosis:       Lumbar spondylosis      (Lumbar spondylosis [M47.816])    Surgeon:  Augustine Houston DO Provider:  Dung Freeman MD    Anesthesia Type:  general ASA Status:  2          Anesthesia Type: general  Last vitals  BP   118/89 (11/13/19 1512)   Temp   98.3 °F (36.8 °C) (11/13/19 1502)   Pulse   60 (11/13/19 1512)   Resp   18 (11/13/19 1512)     SpO2   98 % (11/13/19 1512)     Post Anesthesia Care and Evaluation    Patient location during evaluation: PHASE II  Patient participation: complete - patient participated  Level of consciousness: awake and alert  Pain score: 1  Pain management: adequate  Airway patency: patent  Anesthetic complications: No anesthetic complications  PONV Status: controlled  Cardiovascular status: acceptable  Respiratory status: acceptable  Hydration status: acceptable

## 2019-12-03 ENCOUNTER — PREP FOR SURGERY (OUTPATIENT)
Dept: OTHER | Facility: HOSPITAL | Age: 44
End: 2019-12-03

## 2019-12-03 DIAGNOSIS — M47.816 LUMBAR SPONDYLOSIS: Primary | ICD-10-CM

## 2019-12-03 NOTE — H&P
[id number]  Carlo Wellington  male  1975  Augustine Houston DO  12/3/2019  PATIENT NAME: Carlo Wellington           Past Medical History:   Diagnosis Date   • Back pain    • Bunion    • Depression    • Knee pain, left    • Lumbar disc herniation     X 2   • Lumbar spondylosis 11/5/2019       Past Surgical History:   Procedure Laterality Date   • BUNIONECTOMY Right 6/18/2019    Procedure: BUNIONECTOMY;  Surgeon: Sybil Mortensen DPM;  Location: [facility] OR;  Service: Podiatry   • BUNIONECTOMY Left 8/20/2019    Procedure: BUNIONECTOMY;  Surgeon: Sybil Mrotensen DPM;  Location: [facility] OR;  Service: Podiatry   • GASTROCNEMIUS RECESSION Left 8/20/2019    Procedure: RECESSION GASTROCNEMIUS;  Surgeon: Sybil Mortensen DPM;  Location: [facility] OR;  Service: Podiatry   • MEDIAL BRANCH BLOCK Right 11/13/2019    Procedure: LUMBAR MEDIAL BRANCH BLOCK RIGHT 3 LEVEL L3/4, L4/5, L5/S1;  Surgeon: Augustine Houston DO;  Location: [facility] OR;  Service: Pain Management   • SUBTALAR ARTHRODESIS Right 6/18/2019    Procedure: SUBTALAR ARTHROEREISIS, GASTROC RECESSION;  Surgeon: Sybil Mortensen DPM;  Location: [facility] OR;  Service: Podiatry   • SUBTALAR ARTHRODESIS Left 8/20/2019    Procedure: SUBTALAR ARTHROESIS;  Surgeon: Sybil Mortensen DPM;  Location: [facility] OR;  Service: Podiatry       Social History     Socioeconomic History   • Marital status: Single     Spouse name: Not on file   • Number of children: Not on file   • Years of education: Not on file   • Highest education level: Not on file   Tobacco Use   • Smoking status: Current Every Day Smoker     Packs/day: 0.50     Years: 29.00     Pack years: 14.50     Types: Electronic Cigarette, Cigarettes   • Smokeless tobacco: Never Used   Substance and Sexual Activity   • Alcohol use: Yes     Frequency: Never     Comment: OCASSIONALLY   • Drug use: No   • Sexual activity: Defer       No family history on file.      (Not in a hospital admission)      Review of Systems/Physical  Exam:   Within Normal Limits Abnormal   Mental Status [x]    []     Head, Neck, and Throat [x]   []     Chest/Lungs [x]   []     Cardiovascular [x]   []     Abdomen [x]   []     Extremities [x]   []     Neurologic [x]   []     Other         There were no vitals filed for this visit.    Review of Systems - as previously noted      Diagnosis: M47.816    Plan: RIGHT LUMBAR MEDIAL BRANCH BLOCK 3 LEVEL L3/4, L4/5, L5/S1 (Right)       STATEMENT AS TO REASON OF PLANNED OPERATION:  [x]   H&P revealed no contraindication to planned surgery. (Check if correct).    [x]   Labs (if ordered) have been reviewed and revealed no contraindications to planned surgery. (Check if correct).      Augustine Houston, DO  12/3/2019

## 2019-12-03 NOTE — H&P (VIEW-ONLY)
[id number]  Carlo Wellington  male  1975  Augustine Houston DO  12/3/2019  PATIENT NAME: Carlo Wellington           Past Medical History:   Diagnosis Date   • Back pain    • Bunion    • Depression    • Knee pain, left    • Lumbar disc herniation     X 2   • Lumbar spondylosis 11/5/2019       Past Surgical History:   Procedure Laterality Date   • BUNIONECTOMY Right 6/18/2019    Procedure: BUNIONECTOMY;  Surgeon: Sybil Mortensen DPM;  Location: [facility] OR;  Service: Podiatry   • BUNIONECTOMY Left 8/20/2019    Procedure: BUNIONECTOMY;  Surgeon: Sybil Mortensen DPM;  Location: [facility] OR;  Service: Podiatry   • GASTROCNEMIUS RECESSION Left 8/20/2019    Procedure: RECESSION GASTROCNEMIUS;  Surgeon: Sybil Mortensen DPM;  Location: [facility] OR;  Service: Podiatry   • MEDIAL BRANCH BLOCK Right 11/13/2019    Procedure: LUMBAR MEDIAL BRANCH BLOCK RIGHT 3 LEVEL L3/4, L4/5, L5/S1;  Surgeon: Augustine Houston DO;  Location: [facility] OR;  Service: Pain Management   • SUBTALAR ARTHRODESIS Right 6/18/2019    Procedure: SUBTALAR ARTHROEREISIS, GASTROC RECESSION;  Surgeon: Sybil Mortensen DPM;  Location: [facility] OR;  Service: Podiatry   • SUBTALAR ARTHRODESIS Left 8/20/2019    Procedure: SUBTALAR ARTHROESIS;  Surgeon: Sybil Mortensen DPM;  Location: [facility] OR;  Service: Podiatry       Social History     Socioeconomic History   • Marital status: Single     Spouse name: Not on file   • Number of children: Not on file   • Years of education: Not on file   • Highest education level: Not on file   Tobacco Use   • Smoking status: Current Every Day Smoker     Packs/day: 0.50     Years: 29.00     Pack years: 14.50     Types: Electronic Cigarette, Cigarettes   • Smokeless tobacco: Never Used   Substance and Sexual Activity   • Alcohol use: Yes     Frequency: Never     Comment: OCASSIONALLY   • Drug use: No   • Sexual activity: Defer       No family history on file.      (Not in a hospital admission)      Review of Systems/Physical  Exam:   Within Normal Limits Abnormal   Mental Status [x]    []     Head, Neck, and Throat [x]   []     Chest/Lungs [x]   []     Cardiovascular [x]   []     Abdomen [x]   []     Extremities [x]   []     Neurologic [x]   []     Other         There were no vitals filed for this visit.    Review of Systems - as previously noted      Diagnosis: M47.816    Plan: RIGHT LUMBAR MEDIAL BRANCH BLOCK 3 LEVEL L3/4, L4/5, L5/S1 (Right)       STATEMENT AS TO REASON OF PLANNED OPERATION:  [x]   H&P revealed no contraindication to planned surgery. (Check if correct).    [x]   Labs (if ordered) have been reviewed and revealed no contraindications to planned surgery. (Check if correct).      Augustine Houston, DO  12/3/2019

## 2019-12-18 ENCOUNTER — ANESTHESIA (OUTPATIENT)
Dept: PERIOP | Facility: HOSPITAL | Age: 44
End: 2019-12-18

## 2019-12-18 ENCOUNTER — ANESTHESIA EVENT (OUTPATIENT)
Dept: PERIOP | Facility: HOSPITAL | Age: 44
End: 2019-12-18

## 2019-12-18 ENCOUNTER — HOSPITAL ENCOUNTER (OUTPATIENT)
Facility: HOSPITAL | Age: 44
Setting detail: HOSPITAL OUTPATIENT SURGERY
Discharge: HOME | End: 2019-12-18
Attending: ANESTHESIOLOGY | Admitting: ANESTHESIOLOGY

## 2019-12-18 ENCOUNTER — APPOINTMENT (OUTPATIENT)
Dept: GENERAL RADIOLOGY | Facility: HOSPITAL | Age: 44
End: 2019-12-18

## 2019-12-18 VITALS
TEMPERATURE: 98.2 F | RESPIRATION RATE: 18 BRPM | SYSTOLIC BLOOD PRESSURE: 118 MMHG | WEIGHT: 210 LBS | HEART RATE: 58 BPM | HEIGHT: 71 IN | BODY MASS INDEX: 29.4 KG/M2 | OXYGEN SATURATION: 98 % | DIASTOLIC BLOOD PRESSURE: 84 MMHG

## 2019-12-18 PROCEDURE — 76000 FLUOROSCOPY <1 HR PHYS/QHP: CPT

## 2019-12-18 PROCEDURE — 76000 FLUOROSCOPY <1 HR PHYS/QHP: CPT | Performed by: RADIOLOGY

## 2019-12-18 PROCEDURE — 25010000002 METHYLPREDNISOLONE PER 80 MG: Performed by: ANESTHESIOLOGY

## 2019-12-18 PROCEDURE — 25010000002 MIDAZOLAM PER 1 MG: Performed by: NURSE ANESTHETIST, CERTIFIED REGISTERED

## 2019-12-18 RX ORDER — FENTANYL CITRATE 50 UG/ML
50 INJECTION, SOLUTION INTRAMUSCULAR; INTRAVENOUS
Status: DISCONTINUED | OUTPATIENT
Start: 2019-12-18 | End: 2019-12-18 | Stop reason: HOSPADM

## 2019-12-18 RX ORDER — MEPERIDINE HYDROCHLORIDE 25 MG/ML
12.5 INJECTION INTRAMUSCULAR; INTRAVENOUS; SUBCUTANEOUS
Status: DISCONTINUED | OUTPATIENT
Start: 2019-12-18 | End: 2019-12-18 | Stop reason: HOSPADM

## 2019-12-18 RX ORDER — SODIUM CHLORIDE 0.9 % (FLUSH) 0.9 %
10 SYRINGE (ML) INJECTION EVERY 12 HOURS SCHEDULED
Status: DISCONTINUED | OUTPATIENT
Start: 2019-12-18 | End: 2019-12-18 | Stop reason: HOSPADM

## 2019-12-18 RX ORDER — SODIUM CHLORIDE, SODIUM LACTATE, POTASSIUM CHLORIDE, CALCIUM CHLORIDE 600; 310; 30; 20 MG/100ML; MG/100ML; MG/100ML; MG/100ML
125 INJECTION, SOLUTION INTRAVENOUS CONTINUOUS
Status: DISCONTINUED | OUTPATIENT
Start: 2019-12-18 | End: 2019-12-18 | Stop reason: HOSPADM

## 2019-12-18 RX ORDER — BUPIVACAINE HYDROCHLORIDE 5 MG/ML
INJECTION, SOLUTION PERINEURAL AS NEEDED
Status: DISCONTINUED | OUTPATIENT
Start: 2019-12-18 | End: 2019-12-18 | Stop reason: HOSPADM

## 2019-12-18 RX ORDER — METHYLPREDNISOLONE ACETATE 80 MG/ML
INJECTION, SUSPENSION INTRA-ARTICULAR; INTRALESIONAL; INTRAMUSCULAR; SOFT TISSUE AS NEEDED
Status: DISCONTINUED | OUTPATIENT
Start: 2019-12-18 | End: 2019-12-18 | Stop reason: HOSPADM

## 2019-12-18 RX ORDER — MIDAZOLAM HYDROCHLORIDE 1 MG/ML
INJECTION INTRAMUSCULAR; INTRAVENOUS AS NEEDED
Status: DISCONTINUED | OUTPATIENT
Start: 2019-12-18 | End: 2019-12-18 | Stop reason: SURG

## 2019-12-18 RX ORDER — SODIUM CHLORIDE 0.9 % (FLUSH) 0.9 %
10 SYRINGE (ML) INJECTION AS NEEDED
Status: DISCONTINUED | OUTPATIENT
Start: 2019-12-18 | End: 2019-12-18 | Stop reason: HOSPADM

## 2019-12-18 RX ORDER — ONDANSETRON 2 MG/ML
4 INJECTION INTRAMUSCULAR; INTRAVENOUS AS NEEDED
Status: DISCONTINUED | OUTPATIENT
Start: 2019-12-18 | End: 2019-12-18 | Stop reason: HOSPADM

## 2019-12-18 RX ORDER — OXYCODONE HYDROCHLORIDE AND ACETAMINOPHEN 5; 325 MG/1; MG/1
1 TABLET ORAL ONCE AS NEEDED
Status: DISCONTINUED | OUTPATIENT
Start: 2019-12-18 | End: 2019-12-18 | Stop reason: HOSPADM

## 2019-12-18 RX ORDER — IPRATROPIUM BROMIDE AND ALBUTEROL SULFATE 2.5; .5 MG/3ML; MG/3ML
3 SOLUTION RESPIRATORY (INHALATION) ONCE AS NEEDED
Status: DISCONTINUED | OUTPATIENT
Start: 2019-12-18 | End: 2019-12-18 | Stop reason: HOSPADM

## 2019-12-18 RX ADMIN — MIDAZOLAM HYDROCHLORIDE 2 MG: 1 INJECTION, SOLUTION INTRAMUSCULAR; INTRAVENOUS at 13:55

## 2019-12-18 RX ADMIN — MIDAZOLAM HYDROCHLORIDE 2 MG: 1 INJECTION, SOLUTION INTRAMUSCULAR; INTRAVENOUS at 13:53

## 2019-12-18 NOTE — ANESTHESIA POSTPROCEDURE EVALUATION
Patient: Carlo Wellington    Procedure Summary     Date:  12/18/19 Room / Location:  [facility] OR  /  [facility] OR    Anesthesia Start:  1352 Anesthesia Stop:  1402    Procedure:  RIGHT LUMBAR MEDIAL BRANCH BLOCK 3 LEVEL L3/4, L4/5, L5/S1 (Right ) Diagnosis:       Lumbar spondylosis      (Lumbar spondylosis [M47.816])    Surgeon:  Augustine Houston DO Provider:  Carter Martinez MD    Anesthesia Type:  general ASA Status:  2          Anesthesia Type: general    Vitals  Vitals Value Taken Time   /84 12/18/2019  2:30 PM   Temp 98.2 °F (36.8 °C) 12/18/2019  2:03 PM   Pulse 58 12/18/2019  2:30 PM   Resp 18 12/18/2019  2:30 PM   SpO2 98 % 12/18/2019  2:30 PM           Post Anesthesia Care and Evaluation    Patient location during evaluation: PHASE II  Patient participation: complete - patient participated  Level of consciousness: awake and alert  Pain score: 1  Pain management: adequate  Airway patency: patent  Anesthetic complications: No anesthetic complications  PONV Status: controlled  Cardiovascular status: acceptable  Respiratory status: acceptable  Hydration status: acceptable

## 2019-12-18 NOTE — ANESTHESIA PREPROCEDURE EVALUATION
Anesthesia Evaluation     Patient summary reviewed and Nursing notes reviewed   no history of anesthetic complications:  NPO Solid Status: > 8 hours  NPO Liquid Status: > 8 hours           Airway   Mallampati: II  TM distance: >3 FB  Neck ROM: full  no difficulty expected  Dental - normal exam     Pulmonary - negative pulmonary ROS   (+) decreased breath sounds,   Cardiovascular - negative cardio ROS    Rhythm: regular  Rate: normal        Neuro/Psych- negative ROS  GI/Hepatic/Renal/Endo - negative ROS     Musculoskeletal     (+) chronic pain,   Abdominal    Substance History - negative use     OB/GYN negative ob/gyn ROS         Other - negative ROS                       Anesthesia Plan    ASA 2     general     intravenous induction     Anesthetic plan, all risks, benefits, and alternatives have been provided, discussed and informed consent has been obtained with: patient.  Use of blood products discussed with patient .   Plan discussed with CRNA.

## 2019-12-18 NOTE — OP NOTE
Informed consent was obtained after a discussion of risks, benefits and alternatives to the procedure. Patient agreed to proceed by signing the procedure consent. The patient was placed in a prone position on the fluoroscopy table. Blood pressure, heart rate, and pulse oximetry were monitored throughout the procedure.     Skin overlying the lumbar region was cleansed with ethyl alcohol and chlorahexidine solution.  Next using A/P fluoroscopic views a 25-gauge by 3 1/2 inch spinal needle was advanced to make contact with notch of the sacral ala.  Following this, under fluoroscopic views needles were advanced into the junction of the superior articular process and transverse process at the RIGHT L3, L4 and L5 levels on left sides and positioned with the needle tips just posterior to a line connecting the neuroforaminal space.  Each level was then injected with a mixture of Methylprednisolone and 1cc of 0.5% Bupivicaine.  The needles were then removed intact.  40mg total of methylprednisolone were used.       The patient was monitored for adverse allergic, hemodynamic, or neurologic symptoms after the procedure. The patient tolerated the procedure well and there were no apparent complications. Vital signs remained stable throughout the procedure. The patient was taken to the recovery area where written discharge instructions for the procedure were given. The patient was discharged home.    NOTE: Universal Sterile Protocol was observed throughout the entire procedure and this patient has been educated prior to the performance of this procedure.    CPT 96742  40092  30607    RIGHT Side, 2nd LMBB. RFA next    Augustine Houston DO  12/18/19

## 2019-12-18 NOTE — DISCHARGE INSTRUCTIONS
You have received an injection of local anesthetic and/or corticosteroids. The local anesthetic effect typically last 2-6 hours. It may take 3-10 days for the corticosteroids to reach optimal effect.    Please pay close attention to the following information/instructions:    You may not drive for 12 hours after your injection.    It is common to experience mild soreness at the injection site(s) for 24-48 hours. Ice is the best remedy. You may apply ice for 20 minutes at a time several times a day as needed.    Avoid heat to the injection area for 72 hours. No hot packs, saunas, or steam rooms during this time. A regular shower is OK.    You may restart your regular medication regimen, including pain medications, anti-inflammatory. Restart your blood thinner as directed by your physician.    Please remove the sterile dressing/band-aid tonight or tomorrow morning. Do not leave it on after you have taken a shower or gotten it wet.    Corticosteroid side effects can occur after this injection, but they usually resolve   after several days. These side effects can include flushing, hot flashes, mild palpitations, insomnia, water retention, feeling anxious/restless, or headaches.    While it is extremely rare to get an infection after a spinal procedure, please call the office if you develop any signs of infection. These signs include fevers/chills, severely increased pain, redness at the injections site, or any drainage from the injection site.    Remember that the corticosteroid benefit (long-term pain relief) from an injection can take as long as 10 days to occur. You may have a period of slightly increased pain after your injection before the cortisone takes effect. In some cases the local anesthetic will provide the majority of your injection. Please note this relief as well.     You may resume all of your normal daily activities 24 hours after your injection.    It is OK to restart your exercise or physical therapy  program as soon as you feel comfortable doing so.    Please call our office if you do not know or have your follow-up appointment date.    Please note your pain throughout your post-injection period.

## 2020-02-04 ENCOUNTER — PREP FOR SURGERY (OUTPATIENT)
Dept: OTHER | Facility: HOSPITAL | Age: 45
End: 2020-02-04

## 2020-02-04 DIAGNOSIS — M47.816 LUMBAR SPONDYLOSIS: Primary | ICD-10-CM

## 2020-02-04 NOTE — H&P
[id number]  Carlo Wellington  male  1975  Augustine Houston DO  2/4/2020  PATIENT NAME: Carlo Wellington      BP: ()/()   Arterial Line BP: ()/()     Past Medical History:   Diagnosis Date   • Back pain    • Bunion    • Depression    • Knee pain, left    • Lumbar disc herniation     X 2   • Lumbar spondylosis 11/5/2019       Past Surgical History:   Procedure Laterality Date   • BUNIONECTOMY Right 6/18/2019    Procedure: BUNIONECTOMY;  Surgeon: Sybil Mortensen DPM;  Location:  [facility] OR;  Service: Podiatry   • BUNIONECTOMY Left 8/20/2019    Procedure: BUNIONECTOMY;  Surgeon: Sybil Mortensen DPM;  Location:  [facility] OR;  Service: Podiatry   • GASTROCNEMIUS RECESSION Left 8/20/2019    Procedure: RECESSION GASTROCNEMIUS;  Surgeon: Sybil Mortensen DPM;  Location:  [facility] OR;  Service: Podiatry   • MEDIAL BRANCH BLOCK Right 11/13/2019    Procedure: LUMBAR MEDIAL BRANCH BLOCK RIGHT 3 LEVEL L3/4, L4/5, L5/S1;  Surgeon: Augustine Houston DO;  Location:  [facility] OR;  Service: Pain Management   • MEDIAL BRANCH BLOCK Right 12/18/2019    Procedure: RIGHT LUMBAR MEDIAL BRANCH BLOCK 3 LEVEL L3/4, L4/5, L5/S1;  Surgeon: Augustine Houston DO;  [facility]:  [facility];  Service: Pain Management   • SUBTALAR ARTHRODESIS Right 6/18/2019    Procedure: SUBTALAR ARTHROEREISIS, GASTROC RECESSION;  Surgeon: Sybil Mortensen DPM;  [facility]:  [facility] OR;  Service: Podiatry   • SUBTALAR ARTHRODESIS Left 8/20/2019    Procedure: SUBTALAR ARTHROESIS;  Surgeon: Sybil Mortensen DPM;  Location:  [facility] OR;  Service: Podiatry       Social History     Socioeconomic History   • Marital status: Single     Spouse name: Not on file   • Number of children: Not on file   • Years of education: Not on file   • Highest education level: Not on file   Tobacco Use   • Smoking status: Current Every Day Smoker     Packs/day: 0.50     Years: 29.00     Pack years: 14.50     Types: Electronic Cigarette, Cigarettes   • Smokeless tobacco: Never Used   Substance  and Sexual Activity   • Alcohol use: Yes     Frequency: Never     Comment: OCASSIONALLY   • Drug use: No   • Sexual activity: Defer       No family history on file.      (Not in a hospital admission)      Review of Systems/Physical Exam:   Within Normal Limits Abnormal   Mental Status [x]    []     Head, Neck, and Throat [x]   []     Chest/Lungs [x]   []     Cardiovascular [x]   []     Abdomen [x]   []     Extremities [x]   []     Neurologic [x]   []     Other         There were no vitals filed for this visit.    Review of Systems - as previously noted      Diagnosis: M47.816    Plan: RADIOFREQUENCY ABLATION LUMBAR L3-S1 Right side only (Right)       STATEMENT AS TO REASON OF PLANNED OPERATION:  [x]   H&P revealed no contraindication to planned surgery. (Check if correct).    [x]   Labs (if ordered) have been reviewed and revealed no contraindications to planned surgery. (Check if correct).      Augustine Houston, DO  2/4/2020

## 2020-02-04 NOTE — H&P (VIEW-ONLY)
[id number]  Carlo Wellington  male  1975  Augustine Houston DO  2/4/2020  PATIENT NAME: Carlo Wellington      BP: ()/()   Arterial Line BP: ()/()     Past Medical History:   Diagnosis Date   • Back pain    • Bunion    • Depression    • Knee pain, left    • Lumbar disc herniation     X 2   • Lumbar spondylosis 11/5/2019       Past Surgical History:   Procedure Laterality Date   • BUNIONECTOMY Right 6/18/2019    Procedure: BUNIONECTOMY;  Surgeon: Sybil Mortensen DPM;  Location:  [facility];  Service: Podiatry   • BUNIONECTOMY Left 8/20/2019    Procedure: BUNIONECTOMY;  Surgeon: Sybil Mortensen DPM;  [facility]:  [facility];  Service: Podiatry   • GASTROCNEMIUS RECESSION Left 8/20/2019    Procedure: RECESSION GASTROCNEMIUS;  Surgeon: Sybil Mortensen DPM;  [facility]:  [facility];  Service: Podiatry   • MEDIAL BRANCH BLOCK Right 11/13/2019    Procedure: LUMBAR MEDIAL BRANCH BLOCK RIGHT 3 LEVEL L3/4, L4/5, L5/S1;  Surgeon: Augustine Houston DO;  Location:  [facility];  Service: Pain Management   • MEDIAL BRANCH BLOCK Right 12/18/2019    Procedure: RIGHT LUMBAR MEDIAL BRANCH BLOCK 3 LEVEL L3/4, L4/5, L5/S1;  Surgeon: Augustine Houston DO;  Location:  [facility];  Service: Pain Management   • SUBTALAR ARTHRODESIS Right 6/18/2019    Procedure: SUBTALAR ARTHROEREISIS, GASTROC RECESSION;  Surgeon: Sybil Mortensen DPM;  [facility]:  [facility];  Service: Podiatry   • SUBTALAR ARTHRODESIS Left 8/20/2019    Procedure: SUBTALAR ARTHROESIS;  Surgeon: Sybil Mortensen DPM;  Location:  [facility];  Service: Podiatry       Social History     Socioeconomic History   • Marital status: Single     Spouse name: Not on file   • Number of children: Not on file   • Years of education: Not on file   • Highest education level: Not on file   Tobacco Use   • Smoking status: Current Every Day Smoker     Packs/day: 0.50     Years: 29.00     Pack years: 14.50     Types: Electronic Cigarette, Cigarettes   • Smokeless tobacco: Never Used   Substance  and Sexual Activity   • Alcohol use: Yes     Frequency: Never     Comment: OCASSIONALLY   • Drug use: No   • Sexual activity: Defer       No family history on file.      (Not in a hospital admission)      Review of Systems/Physical Exam:   Within Normal Limits Abnormal   Mental Status [x]    []     Head, Neck, and Throat [x]   []     Chest/Lungs [x]   []     Cardiovascular [x]   []     Abdomen [x]   []     Extremities [x]   []     Neurologic [x]   []     Other         There were no vitals filed for this visit.    Review of Systems - as previously noted      Diagnosis: M47.816    Plan: RADIOFREQUENCY ABLATION LUMBAR L3-S1 Right side only (Right)       STATEMENT AS TO REASON OF PLANNED OPERATION:  [x]   H&P revealed no contraindication to planned surgery. (Check if correct).    [x]   Labs (if ordered) have been reviewed and revealed no contraindications to planned surgery. (Check if correct).      Augustine Hosuton, DO  2/4/2020

## 2020-02-12 ENCOUNTER — APPOINTMENT (OUTPATIENT)
Dept: GENERAL RADIOLOGY | Facility: HOSPITAL | Age: 45
End: 2020-02-12

## 2020-02-12 ENCOUNTER — HOSPITAL ENCOUNTER (OUTPATIENT)
Facility: HOSPITAL | Age: 45
Setting detail: HOSPITAL OUTPATIENT SURGERY
Discharge: HOME | End: 2020-02-12
Attending: ANESTHESIOLOGY | Admitting: ANESTHESIOLOGY

## 2020-02-12 VITALS
HEIGHT: 71 IN | HEART RATE: 70 BPM | RESPIRATION RATE: 20 BRPM | OXYGEN SATURATION: 96 % | BODY MASS INDEX: 29.4 KG/M2 | WEIGHT: 210 LBS | SYSTOLIC BLOOD PRESSURE: 115 MMHG | TEMPERATURE: 98.4 F | DIASTOLIC BLOOD PRESSURE: 71 MMHG

## 2020-02-12 PROCEDURE — 25010000002 METHYLPREDNISOLONE PER 80 MG: Performed by: ANESTHESIOLOGY

## 2020-02-12 PROCEDURE — 76000 FLUOROSCOPY <1 HR PHYS/QHP: CPT | Performed by: RADIOLOGY

## 2020-02-12 PROCEDURE — 76000 FLUOROSCOPY <1 HR PHYS/QHP: CPT

## 2020-02-12 RX ORDER — METHYLPREDNISOLONE ACETATE 80 MG/ML
INJECTION, SUSPENSION INTRA-ARTICULAR; INTRALESIONAL; INTRAMUSCULAR; SOFT TISSUE AS NEEDED
Status: DISCONTINUED | OUTPATIENT
Start: 2020-02-12 | End: 2020-02-12 | Stop reason: HOSPADM

## 2020-02-12 RX ORDER — LIDOCAINE HYDROCHLORIDE 20 MG/ML
INJECTION, SOLUTION INFILTRATION; PERINEURAL AS NEEDED
Status: DISCONTINUED | OUTPATIENT
Start: 2020-02-12 | End: 2020-02-12 | Stop reason: HOSPADM

## 2020-02-12 RX ORDER — CYCLOBENZAPRINE HCL 10 MG
10 TABLET ORAL 3 TIMES DAILY PRN
COMMUNITY
End: 2020-05-29

## 2020-02-12 RX ORDER — BUPIVACAINE HYDROCHLORIDE 5 MG/ML
INJECTION, SOLUTION PERINEURAL AS NEEDED
Status: DISCONTINUED | OUTPATIENT
Start: 2020-02-12 | End: 2020-02-12 | Stop reason: HOSPADM

## 2020-02-12 NOTE — OP NOTE
The risk and benefits of the procedure were discussed with patient, as well as alternative therapy options. Patient verbalized an understood and agreed to proceed.     The patient was brought into the fluoroscopy suite and placed in a prone position on the procedure table. The patient's blood pressure, heart rate, and pulse oximetry were monitored throughout the procedure. The skin overlying the lumbar region was cleansed with ethyl alcohol followed by a chlorohexidine solution and allowed to dry. Next, sterile towels were placed around the sterile field, and sterile technique was maintained throughout the procedure.  The skin and soft tissue at the injection sites were then infiltrated with 4 mL of 1% Lidocaine for local anesthesia.  Using A/P fluoroscopic views a 20 gauge curved SMK needle was advanced to make contact with notch of the sacral ala.  Following this, using oblique fluoroscopic views SMK needles were advanced into the junction of the superior articular process and transverse process at the L3, L4 and L5 levels on the affected side and positioned with the needle tips just posterior to a line connecting the neuroforaminal space. Motor testing was then conducted at 2 Hz up to 2.5 volts.  There was no motor response elicited at any of the levels. Following this each level was injected with 1 mL of a mixture of 50/50 of 1% Lidocaine and 0.5% bupivacaine mixed with Depomed. After local anesthetic pretreatment, radiofrequency lesioning was performed at 85 degrees Celsius for 90 seconds at each level. The needles were removed intact. A total of 40mg methylprednisolone was used.    The patient was taken to the recovery area in stable condition.  There were no complications of the procedure noted.      RIGHT RFA LMBB 3 level    CPT   79611  68660,x2    Augustine Houston DO  02/12/20

## 2020-04-26 ENCOUNTER — HOSPITAL ENCOUNTER (EMERGENCY)
Facility: HOSPITAL | Age: 45
Discharge: HOME | End: 2020-04-26
Attending: FAMILY MEDICINE | Admitting: FAMILY MEDICINE

## 2020-04-26 ENCOUNTER — APPOINTMENT (OUTPATIENT)
Dept: CT IMAGING | Facility: HOSPITAL | Age: 45
End: 2020-04-26

## 2020-04-26 ENCOUNTER — APPOINTMENT (OUTPATIENT)
Dept: GENERAL RADIOLOGY | Facility: HOSPITAL | Age: 45
End: 2020-04-26

## 2020-04-26 VITALS
DIASTOLIC BLOOD PRESSURE: 84 MMHG | HEART RATE: 82 BPM | BODY MASS INDEX: 29.4 KG/M2 | OXYGEN SATURATION: 100 % | SYSTOLIC BLOOD PRESSURE: 133 MMHG | WEIGHT: 210 LBS | RESPIRATION RATE: 18 BRPM | HEIGHT: 71 IN | TEMPERATURE: 98.7 F

## 2020-04-26 DIAGNOSIS — R11.2 NON-INTRACTABLE VOMITING WITH NAUSEA, UNSPECIFIED VOMITING TYPE: Primary | ICD-10-CM

## 2020-04-26 DIAGNOSIS — R06.6 INTRACTABLE HICCUPS: ICD-10-CM

## 2020-04-26 LAB
ALBUMIN SERPL-MCNC: 4.45 G/DL (ref 3.5–5.2)
ALBUMIN/GLOB SERPL: 1 G/DL
ALP SERPL-CCNC: 84 U/L (ref 39–117)
ALT SERPL W P-5'-P-CCNC: 59 U/L (ref 1–41)
AMPHET+METHAMPHET UR QL: NEGATIVE
AMYLASE SERPL-CCNC: 132 U/L (ref 28–100)
ANION GAP SERPL CALCULATED.3IONS-SCNC: 11.7 MMOL/L (ref 5–15)
APTT PPP: 29 SECONDS (ref 23.8–36.1)
AST SERPL-CCNC: 31 U/L (ref 1–40)
BARBITURATES UR QL SCN: NEGATIVE
BASOPHILS # BLD AUTO: 0.02 10*3/MM3 (ref 0–0.2)
BASOPHILS NFR BLD AUTO: 0.2 % (ref 0–1.5)
BENZODIAZ UR QL SCN: NEGATIVE
BILIRUB SERPL-MCNC: 0.3 MG/DL (ref 0.2–1.2)
BILIRUB UR QL STRIP: NEGATIVE
BUN BLD-MCNC: 10 MG/DL (ref 6–20)
BUN/CREAT SERPL: 8.8 (ref 7–25)
CALCIUM SPEC-SCNC: 10 MG/DL (ref 8.6–10.5)
CANNABINOIDS SERPL QL: NEGATIVE
CHLORIDE SERPL-SCNC: 98 MMOL/L (ref 98–107)
CLARITY UR: CLEAR
CO2 SERPL-SCNC: 29.3 MMOL/L (ref 22–29)
COCAINE UR QL: NEGATIVE
COLOR UR: YELLOW
CREAT BLD-MCNC: 1.14 MG/DL (ref 0.76–1.27)
CRP SERPL-MCNC: 2.79 MG/DL (ref 0–0.5)
DEPRECATED RDW RBC AUTO: 45.5 FL (ref 37–54)
EOSINOPHIL # BLD AUTO: 0.35 10*3/MM3 (ref 0–0.4)
EOSINOPHIL NFR BLD AUTO: 3.9 % (ref 0.3–6.2)
ERYTHROCYTE [DISTWIDTH] IN BLOOD BY AUTOMATED COUNT: 14.9 % (ref 12.3–15.4)
ERYTHROCYTE [SEDIMENTATION RATE] IN BLOOD: 10 MM/HR (ref 0–15)
GFR SERPL CREATININE-BSD FRML MDRD: 85 ML/MIN/1.73
GLOBULIN UR ELPH-MCNC: 4.7 GM/DL
GLUCOSE BLD-MCNC: 104 MG/DL (ref 65–99)
GLUCOSE UR STRIP-MCNC: NEGATIVE MG/DL
HCT VFR BLD AUTO: 49 % (ref 37.5–51)
HGB BLD-MCNC: 15.7 G/DL (ref 13–17.7)
HGB UR QL STRIP.AUTO: NEGATIVE
IMM GRANULOCYTES # BLD AUTO: 0.02 10*3/MM3 (ref 0–0.05)
IMM GRANULOCYTES NFR BLD AUTO: 0.2 % (ref 0–0.5)
INR PPP: 0.9 (ref 0.9–1.1)
KETONES UR QL STRIP: NEGATIVE
LEUKOCYTE ESTERASE UR QL STRIP.AUTO: NEGATIVE
LIPASE SERPL-CCNC: 21 U/L (ref 13–60)
LYMPHOCYTES # BLD AUTO: 2.14 10*3/MM3 (ref 0.7–3.1)
LYMPHOCYTES NFR BLD AUTO: 23.5 % (ref 19.6–45.3)
MCH RBC QN AUTO: 26.9 PG (ref 26.6–33)
MCHC RBC AUTO-ENTMCNC: 32 G/DL (ref 31.5–35.7)
MCV RBC AUTO: 84 FL (ref 79–97)
METHADONE UR QL SCN: NEGATIVE
MONOCYTES # BLD AUTO: 1.04 10*3/MM3 (ref 0.1–0.9)
MONOCYTES NFR BLD AUTO: 11.4 % (ref 5–12)
NEUTROPHILS # BLD AUTO: 5.52 10*3/MM3 (ref 1.7–7)
NEUTROPHILS NFR BLD AUTO: 60.8 % (ref 42.7–76)
NITRITE UR QL STRIP: NEGATIVE
NRBC BLD AUTO-RTO: 0 /100 WBC (ref 0–0.2)
OPIATES UR QL: NEGATIVE
OXYCODONE UR QL SCN: NEGATIVE
PH UR STRIP.AUTO: 6.5 [PH] (ref 5–8)
PLATELET # BLD AUTO: 178 10*3/MM3 (ref 140–450)
PMV BLD AUTO: 11.5 FL (ref 6–12)
POTASSIUM BLD-SCNC: 4.6 MMOL/L (ref 3.5–5.2)
PROT SERPL-MCNC: 9.1 G/DL (ref 6–8.5)
PROT UR QL STRIP: NEGATIVE
PROTHROMBIN TIME: 12.6 SECONDS (ref 11–15.4)
RBC # BLD AUTO: 5.83 10*6/MM3 (ref 4.14–5.8)
S PYO AG THROAT QL: NEGATIVE
SODIUM BLD-SCNC: 139 MMOL/L (ref 136–145)
SP GR UR STRIP: 1.01 (ref 1–1.03)
TROPONIN T SERPL-MCNC: <0.01 NG/ML (ref 0–0.03)
UROBILINOGEN UR QL STRIP: NORMAL
WBC NRBC COR # BLD: 9.09 10*3/MM3 (ref 3.4–10.8)

## 2020-04-26 PROCEDURE — 0 IOVERSOL 68 % SOLUTION: Performed by: FAMILY MEDICINE

## 2020-04-26 PROCEDURE — 80307 DRUG TEST PRSMV CHEM ANLYZR: CPT | Performed by: FAMILY MEDICINE

## 2020-04-26 PROCEDURE — 85610 PROTHROMBIN TIME: CPT | Performed by: FAMILY MEDICINE

## 2020-04-26 PROCEDURE — 96374 THER/PROPH/DIAG INJ IV PUSH: CPT

## 2020-04-26 PROCEDURE — 86140 C-REACTIVE PROTEIN: CPT | Performed by: FAMILY MEDICINE

## 2020-04-26 PROCEDURE — 87081 CULTURE SCREEN ONLY: CPT | Performed by: FAMILY MEDICINE

## 2020-04-26 PROCEDURE — 87880 STREP A ASSAY W/OPTIC: CPT | Performed by: FAMILY MEDICINE

## 2020-04-26 PROCEDURE — 81003 URINALYSIS AUTO W/O SCOPE: CPT | Performed by: FAMILY MEDICINE

## 2020-04-26 PROCEDURE — 93005 ELECTROCARDIOGRAM TRACING: CPT | Performed by: FAMILY MEDICINE

## 2020-04-26 PROCEDURE — 80053 COMPREHEN METABOLIC PANEL: CPT | Performed by: FAMILY MEDICINE

## 2020-04-26 PROCEDURE — 74022 RADEX COMPL AQT ABD SERIES: CPT

## 2020-04-26 PROCEDURE — 84484 ASSAY OF TROPONIN QUANT: CPT | Performed by: FAMILY MEDICINE

## 2020-04-26 PROCEDURE — 85730 THROMBOPLASTIN TIME PARTIAL: CPT | Performed by: FAMILY MEDICINE

## 2020-04-26 PROCEDURE — 85025 COMPLETE CBC W/AUTO DIFF WBC: CPT | Performed by: FAMILY MEDICINE

## 2020-04-26 PROCEDURE — 25010000002 CHLORPROMAZINE HCL 50 MG/2ML SOLUTION: Performed by: FAMILY MEDICINE

## 2020-04-26 PROCEDURE — 96372 THER/PROPH/DIAG INJ SC/IM: CPT

## 2020-04-26 PROCEDURE — 74177 CT ABD & PELVIS W/CONTRAST: CPT | Performed by: RADIOLOGY

## 2020-04-26 PROCEDURE — 25010000002 METOCLOPRAMIDE PER 10 MG: Performed by: FAMILY MEDICINE

## 2020-04-26 PROCEDURE — 82150 ASSAY OF AMYLASE: CPT | Performed by: FAMILY MEDICINE

## 2020-04-26 PROCEDURE — 99284 EMERGENCY DEPT VISIT MOD MDM: CPT

## 2020-04-26 PROCEDURE — 83690 ASSAY OF LIPASE: CPT | Performed by: FAMILY MEDICINE

## 2020-04-26 PROCEDURE — 85652 RBC SED RATE AUTOMATED: CPT | Performed by: FAMILY MEDICINE

## 2020-04-26 PROCEDURE — 93010 ELECTROCARDIOGRAM REPORT: CPT | Performed by: INTERNAL MEDICINE

## 2020-04-26 PROCEDURE — 74177 CT ABD & PELVIS W/CONTRAST: CPT

## 2020-04-26 RX ORDER — ONDANSETRON 2 MG/ML
4 INJECTION INTRAMUSCULAR; INTRAVENOUS ONCE
Status: DISCONTINUED | OUTPATIENT
Start: 2020-04-26 | End: 2020-04-26

## 2020-04-26 RX ORDER — CHLORPROMAZINE HYDROCHLORIDE 25 MG/1
25 TABLET, FILM COATED ORAL 3 TIMES DAILY PRN
Qty: 15 TABLET | Refills: 0 | Status: ON HOLD | OUTPATIENT
Start: 2020-04-26 | End: 2020-06-28

## 2020-04-26 RX ORDER — METOCLOPRAMIDE HYDROCHLORIDE 5 MG/ML
10 INJECTION INTRAMUSCULAR; INTRAVENOUS ONCE
Status: COMPLETED | OUTPATIENT
Start: 2020-04-26 | End: 2020-04-26

## 2020-04-26 RX ORDER — SODIUM CHLORIDE 0.9 % (FLUSH) 0.9 %
10 SYRINGE (ML) INJECTION AS NEEDED
Status: DISCONTINUED | OUTPATIENT
Start: 2020-04-26 | End: 2020-04-26 | Stop reason: HOSPADM

## 2020-04-26 RX ORDER — CHLORPROMAZINE HYDROCHLORIDE 25 MG/ML
25 INJECTION INTRAMUSCULAR ONCE
Status: COMPLETED | OUTPATIENT
Start: 2020-04-26 | End: 2020-04-26

## 2020-04-26 RX ADMIN — METOCLOPRAMIDE 10 MG: 5 INJECTION, SOLUTION INTRAMUSCULAR; INTRAVENOUS at 08:36

## 2020-04-26 RX ADMIN — CHLORPROMAZINE HYDROCHLORIDE 25 MG: 25 INJECTION INTRAMUSCULAR at 10:22

## 2020-04-26 RX ADMIN — SODIUM CHLORIDE 500 ML: 9 INJECTION, SOLUTION INTRAVENOUS at 08:36

## 2020-04-26 RX ADMIN — IOVERSOL 85 ML: 678 INJECTION INTRA-ARTERIAL; INTRAVENOUS at 10:37

## 2020-04-26 NOTE — ED PROVIDER NOTES
Subjective   44-year-old male presents emergency department with a past medical history of depression and chronic low back pain with complaints of nausea vomiting and fever fever was 3 days ago he also had a sore throat at that time which then progressed to the vomiting patient reports that since he has had the vomiting for the last 3 days he has had intractable hiccups to the point where he cannot get any sleep at night any hiccups of motion makes the vomiting worse.      History provided by:  Patient  Hiccups   Location:  3 days continuous hiccups  Severity:  Moderate  Onset quality:  Gradual  Duration:  3 days  Timing:  Constant  Progression:  Unchanged  Chronicity:  New  Context:  See hpi  Relieved by:  Nothing  Worsened by:  Holding breath/juice  Associated symptoms: fever, nausea, sore throat and vomiting    Associated symptoms: no abdominal pain and no chest pain        Review of Systems   Constitutional: Positive for fever.   HENT: Positive for sore throat.    Respiratory: Negative.    Cardiovascular: Negative.  Negative for chest pain.   Gastrointestinal: Positive for nausea and vomiting. Negative for abdominal pain.   Endocrine: Negative.    Genitourinary: Negative.  Negative for dysuria.   Skin: Negative.    Neurological: Negative.    Psychiatric/Behavioral: Negative.    All other systems reviewed and are negative.      Past Medical History:   Diagnosis Date   • Back pain    • Bunion    • Depression    • Knee pain, left    • Lumbar disc herniation     X 2   • Lumbar spondylosis 11/5/2019       No Known Allergies    Past Surgical History:   Procedure Laterality Date   • BUNIONECTOMY Right 6/18/2019    Procedure: BUNIONECTOMY;  Surgeon: Sybil Mortensen DPM;  Location: [facility];  Service: Podiatry   • BUNIONECTOMY Left 8/20/2019    Procedure: BUNIONECTOMY;  Surgeon: Sybil Mortensen DPM;  Location: [facility];  Service: Podiatry   • GASTROCNEMIUS RECESSION Left 8/20/2019    Procedure: RECESSION  GASTROCNEMIUS;  Surgeon: Sybil Mortensen DPM;  Location: [facility] OR;  Service: Podiatry   • MEDIAL BRANCH BLOCK Right 11/13/2019    Procedure: LUMBAR MEDIAL BRANCH BLOCK RIGHT 3 LEVEL L3/4, L4/5, L5/S1;  Surgeon: Augustine Houston DO;  Location:  [facility] OR;  Service: Pain Management   • MEDIAL BRANCH BLOCK Right 12/18/2019    Procedure: RIGHT LUMBAR MEDIAL BRANCH BLOCK 3 LEVEL L3/4, L4/5, L5/S1;  Surgeon: Augustine Houston DO;  Location:  [facility];  Service: Pain Management   • RADIOFREQUENCY ABLATION Right 2/12/2020    Procedure: RADIOFREQUENCY ABLATION LUMBAR L3-S1 Right side only;  Surgeon: Augustine Houston DO;  Location:  [facility];  Service: Pain Management;  Laterality: Right;   • SUBTALAR ARTHRODESIS Right 6/18/2019    Procedure: SUBTALAR ARTHROEREISIS, GASTROC RECESSION;  Surgeon: Sybil Mortensen DPM;  Location: [facility] OR;  Service: Podiatry   • SUBTALAR ARTHRODESIS Left 8/20/2019    Procedure: SUBTALAR ARTHROESIS;  Surgeon: Sybil Mortensen DPM;  Location: [facility] OR;  Service: Podiatry       No family history on file.    Social History     Socioeconomic History   • Marital status: Single     Spouse name: Not on file   • Number of children: Not on file   • Years of education: Not on file   • Highest education level: Not on file   Tobacco Use   • Smoking status: Current Every Day Smoker     Packs/day: 0.50     Years: 29.00     Pack years: 14.50     Types: Electronic Cigarette, Cigarettes   • Smokeless tobacco: Never Used   Substance and Sexual Activity   • Alcohol use: Yes     Frequency: Never     Comment: OCASSIONALLY   • Drug use: No   • Sexual activity: Defer           Objective   Physical Exam   Constitutional: He is oriented to person, place, and time. He appears well-developed and well-nourished.   Actively demonstrating hiccups   HENT:   Head: Normocephalic and atraumatic.   Right Ear: External ear normal.   Left Ear: External ear normal.   Mouth/Throat: Oropharynx is clear and moist.   Eyes:  Pupils are equal, round, and reactive to light. EOM are normal.   Neck: Neck supple.   Cardiovascular: Normal rate and regular rhythm.   Pulmonary/Chest: Effort normal and breath sounds normal.   Abdominal: Soft. Bowel sounds are normal.   Musculoskeletal: Normal range of motion.   Neurological: He is alert and oriented to person, place, and time.   Skin: Skin is warm. Capillary refill takes less than 2 seconds. He is not diaphoretic.   Psychiatric: He has a normal mood and affect. His behavior is normal. Judgment and thought content normal.   Nursing note and vitals reviewed.      Procedures           ED Course  ED Course as of Apr 26 1646   Sun Apr 26, 2020   1049 After administration of IV  thorazine hiccups have resolved.    [MH]      ED Course User Index  [MH] Sonya Shepard DO                                           MDM  Number of Diagnoses or Management Options  Intractable hiccups: new and requires workup  Non-intractable vomiting with nausea, unspecified vomiting type: new and requires workup     Amount and/or Complexity of Data Reviewed  Clinical lab tests: ordered and reviewed  Tests in the radiology section of CPT®: reviewed and ordered  Tests in the medicine section of CPT®: reviewed and ordered  Independent visualization of images, tracings, or specimens: yes    Risk of Complications, Morbidity, and/or Mortality  Presenting problems: high  Diagnostic procedures: moderate  Management options: moderate    Patient Progress  Patient progress: improved      Final diagnoses:   Non-intractable vomiting with nausea, unspecified vomiting type   Intractable hiccups            Sonya Shepard DO  04/26/20 7920

## 2020-04-28 LAB — BACTERIA SPEC AEROBE CULT: NORMAL

## 2020-05-28 ENCOUNTER — PREP FOR SURGERY (OUTPATIENT)
Dept: OTHER | Facility: HOSPITAL | Age: 45
End: 2020-05-28

## 2020-05-28 ENCOUNTER — HOSPITAL ENCOUNTER (OUTPATIENT)
Dept: MAMMOGRAPHY | Facility: HOSPITAL | Age: 45
Discharge: HOME | End: 2020-05-28

## 2020-05-28 ENCOUNTER — HOSPITAL ENCOUNTER (OUTPATIENT)
Dept: ULTRASOUND IMAGING | Facility: HOSPITAL | Age: 45
Discharge: HOME | End: 2020-05-28
Admitting: FAMILY MEDICINE

## 2020-05-28 DIAGNOSIS — N64.4 BREAST PAIN: ICD-10-CM

## 2020-05-28 DIAGNOSIS — M72.2 PLANTAR FASCIITIS, RIGHT: Primary | ICD-10-CM

## 2020-05-28 PROCEDURE — 77066 DX MAMMO INCL CAD BI: CPT

## 2020-05-28 PROCEDURE — G0279 TOMOSYNTHESIS, MAMMO: HCPCS

## 2020-05-28 RX ORDER — CEFAZOLIN SODIUM 2 G/50ML
2 SOLUTION INTRAVENOUS ONCE
Status: CANCELLED | OUTPATIENT
Start: 2020-05-28 | End: 2020-05-28

## 2020-05-29 ENCOUNTER — TRANSCRIBE ORDERS (OUTPATIENT)
Dept: ADMINISTRATIVE | Facility: HOSPITAL | Age: 45
End: 2020-05-29

## 2020-05-29 ENCOUNTER — APPOINTMENT (OUTPATIENT)
Dept: PREADMISSION TESTING | Facility: HOSPITAL | Age: 45
End: 2020-05-29

## 2020-05-29 ENCOUNTER — LAB (OUTPATIENT)
Dept: LAB | Facility: HOSPITAL | Age: 45
End: 2020-05-29

## 2020-05-29 VITALS — WEIGHT: 215 LBS | BODY MASS INDEX: 30.1 KG/M2 | HEIGHT: 71 IN

## 2020-05-29 DIAGNOSIS — M72.2 PLANTAR FASCIITIS, RIGHT: ICD-10-CM

## 2020-05-29 DIAGNOSIS — Z01.818 PRE-OPERATIVE CLEARANCE: Primary | ICD-10-CM

## 2020-05-29 DIAGNOSIS — Z01.818 PRE-OPERATIVE CLEARANCE: ICD-10-CM

## 2020-05-29 LAB
ANION GAP SERPL CALCULATED.3IONS-SCNC: 11.6 MMOL/L (ref 5–15)
BASOPHILS # BLD AUTO: 0.03 10*3/MM3 (ref 0–0.2)
BASOPHILS NFR BLD AUTO: 0.4 % (ref 0–1.5)
BILIRUB UR QL STRIP: NEGATIVE
BUN BLD-MCNC: 12 MG/DL (ref 6–20)
BUN/CREAT SERPL: 11.8 (ref 7–25)
CALCIUM SPEC-SCNC: 9.3 MG/DL (ref 8.6–10.5)
CHLORIDE SERPL-SCNC: 101 MMOL/L (ref 98–107)
CLARITY UR: CLEAR
CO2 SERPL-SCNC: 24.4 MMOL/L (ref 22–29)
COLOR UR: YELLOW
CREAT BLD-MCNC: 1.02 MG/DL (ref 0.76–1.27)
DEPRECATED RDW RBC AUTO: 45.5 FL (ref 37–54)
EOSINOPHIL # BLD AUTO: 0.22 10*3/MM3 (ref 0–0.4)
EOSINOPHIL NFR BLD AUTO: 3 % (ref 0.3–6.2)
ERYTHROCYTE [DISTWIDTH] IN BLOOD BY AUTOMATED COUNT: 14.9 % (ref 12.3–15.4)
GFR SERPL CREATININE-BSD FRML MDRD: 97 ML/MIN/1.73
GLUCOSE BLD-MCNC: 118 MG/DL (ref 65–99)
GLUCOSE UR STRIP-MCNC: NEGATIVE MG/DL
HCT VFR BLD AUTO: 46.1 % (ref 37.5–51)
HGB BLD-MCNC: 14.6 G/DL (ref 13–17.7)
HGB UR QL STRIP.AUTO: NEGATIVE
IMM GRANULOCYTES # BLD AUTO: 0.01 10*3/MM3 (ref 0–0.05)
IMM GRANULOCYTES NFR BLD AUTO: 0.1 % (ref 0–0.5)
KETONES UR QL STRIP: NEGATIVE
LEUKOCYTE ESTERASE UR QL STRIP.AUTO: NEGATIVE
LYMPHOCYTES # BLD AUTO: 2.75 10*3/MM3 (ref 0.7–3.1)
LYMPHOCYTES NFR BLD AUTO: 36.9 % (ref 19.6–45.3)
MCH RBC QN AUTO: 26.8 PG (ref 26.6–33)
MCHC RBC AUTO-ENTMCNC: 31.7 G/DL (ref 31.5–35.7)
MCV RBC AUTO: 84.6 FL (ref 79–97)
MONOCYTES # BLD AUTO: 0.78 10*3/MM3 (ref 0.1–0.9)
MONOCYTES NFR BLD AUTO: 10.5 % (ref 5–12)
NEUTROPHILS # BLD AUTO: 3.66 10*3/MM3 (ref 1.7–7)
NEUTROPHILS NFR BLD AUTO: 49.1 % (ref 42.7–76)
NITRITE UR QL STRIP: NEGATIVE
NRBC BLD AUTO-RTO: 0 /100 WBC (ref 0–0.2)
PH UR STRIP.AUTO: 6 [PH] (ref 5–8)
PLATELET # BLD AUTO: 175 10*3/MM3 (ref 140–450)
PMV BLD AUTO: 11.1 FL (ref 6–12)
POTASSIUM BLD-SCNC: 4.1 MMOL/L (ref 3.5–5.2)
PROT UR QL STRIP: NEGATIVE
RBC # BLD AUTO: 5.45 10*6/MM3 (ref 4.14–5.8)
SODIUM BLD-SCNC: 137 MMOL/L (ref 136–145)
SP GR UR STRIP: 1.02 (ref 1–1.03)
UROBILINOGEN UR QL STRIP: NORMAL
WBC NRBC COR # BLD: 7.45 10*3/MM3 (ref 3.4–10.8)

## 2020-05-29 PROCEDURE — U0004 COV-19 TEST NON-CDC HGH THRU: HCPCS

## 2020-05-29 PROCEDURE — 85025 COMPLETE CBC W/AUTO DIFF WBC: CPT | Performed by: ANESTHESIOLOGY

## 2020-05-29 PROCEDURE — 80048 BASIC METABOLIC PNL TOTAL CA: CPT | Performed by: ANESTHESIOLOGY

## 2020-05-29 PROCEDURE — C9803 HOPD COVID-19 SPEC COLLECT: HCPCS

## 2020-05-29 PROCEDURE — U0002 COVID-19 LAB TEST NON-CDC: HCPCS

## 2020-05-29 PROCEDURE — 81003 URINALYSIS AUTO W/O SCOPE: CPT | Performed by: PODIATRIST

## 2020-05-29 PROCEDURE — 36415 COLL VENOUS BLD VENIPUNCTURE: CPT

## 2020-05-29 RX ORDER — HYDROCODONE BITARTRATE AND ACETAMINOPHEN 5; 325 MG/1; MG/1
1 TABLET ORAL EVERY 6 HOURS PRN
COMMUNITY
End: 2020-06-02 | Stop reason: HOSPADM

## 2020-05-29 RX ORDER — TRAZODONE HYDROCHLORIDE 100 MG/1
100 TABLET ORAL NIGHTLY
COMMUNITY

## 2020-05-30 LAB
REF LAB TEST METHOD: NORMAL
SARS-COV-2 RNA RESP QL NAA+PROBE: NOT DETECTED

## 2020-06-01 ENCOUNTER — PREP FOR SURGERY (OUTPATIENT)
Dept: OTHER | Facility: HOSPITAL | Age: 45
End: 2020-06-01

## 2020-06-01 DIAGNOSIS — T84.84XA PAINFUL ORTHOPAEDIC HARDWARE (HCC): ICD-10-CM

## 2020-06-01 DIAGNOSIS — M72.2 PLANTAR FASCIITIS OF RIGHT FOOT: ICD-10-CM

## 2020-06-01 DIAGNOSIS — M76.821 POSTERIOR TIBIAL TENDON DYSFUNCTION (PTTD) OF RIGHT LOWER EXTREMITY: Primary | ICD-10-CM

## 2020-06-01 RX ORDER — CEFAZOLIN SODIUM 2 G/50ML
2 SOLUTION INTRAVENOUS ONCE
Status: CANCELLED | OUTPATIENT
Start: 2020-06-02 | End: 2020-06-01

## 2020-06-02 ENCOUNTER — APPOINTMENT (OUTPATIENT)
Dept: GENERAL RADIOLOGY | Facility: HOSPITAL | Age: 45
End: 2020-06-02

## 2020-06-02 ENCOUNTER — ANESTHESIA (OUTPATIENT)
Dept: PERIOP | Facility: HOSPITAL | Age: 45
End: 2020-06-02

## 2020-06-02 ENCOUNTER — ANESTHESIA EVENT (OUTPATIENT)
Dept: PERIOP | Facility: HOSPITAL | Age: 45
End: 2020-06-02

## 2020-06-02 ENCOUNTER — HOSPITAL ENCOUNTER (OUTPATIENT)
Facility: HOSPITAL | Age: 45
Setting detail: HOSPITAL OUTPATIENT SURGERY
Discharge: HOME | End: 2020-06-02
Attending: PODIATRIST | Admitting: PODIATRIST

## 2020-06-02 VITALS
WEIGHT: 209 LBS | TEMPERATURE: 97.5 F | HEIGHT: 71 IN | SYSTOLIC BLOOD PRESSURE: 135 MMHG | OXYGEN SATURATION: 100 % | HEART RATE: 71 BPM | BODY MASS INDEX: 29.26 KG/M2 | DIASTOLIC BLOOD PRESSURE: 87 MMHG | RESPIRATION RATE: 18 BRPM

## 2020-06-02 DIAGNOSIS — M25.374 INSTABILITY OF RIGHT FOOT JOINT: ICD-10-CM

## 2020-06-02 DIAGNOSIS — M21.41 PES PLANUS OF RIGHT FOOT: Primary | ICD-10-CM

## 2020-06-02 DIAGNOSIS — M72.2 PLANTAR FASCIITIS OF RIGHT FOOT: ICD-10-CM

## 2020-06-02 DIAGNOSIS — M76.821 POSTERIOR TIBIAL TENDON DYSFUNCTION (PTTD) OF RIGHT LOWER EXTREMITY: ICD-10-CM

## 2020-06-02 DIAGNOSIS — T84.84XA PAINFUL ORTHOPAEDIC HARDWARE (HCC): ICD-10-CM

## 2020-06-02 DIAGNOSIS — M79.671 RIGHT FOOT PAIN: ICD-10-CM

## 2020-06-02 PROCEDURE — 25010000002 PROPOFOL 10 MG/ML EMULSION: Performed by: NURSE ANESTHETIST, CERTIFIED REGISTERED

## 2020-06-02 PROCEDURE — 25010000002 KETOROLAC TROMETHAMINE PER 15 MG: Performed by: NURSE ANESTHETIST, CERTIFIED REGISTERED

## 2020-06-02 PROCEDURE — 25010000002 MIDAZOLAM PER 1MG: Performed by: ANESTHESIOLOGY

## 2020-06-02 PROCEDURE — 25010000002 FENTANYL CITRATE (PF) 100 MCG/2ML SOLUTION: Performed by: NURSE ANESTHETIST, CERTIFIED REGISTERED

## 2020-06-02 PROCEDURE — C1769 GUIDE WIRE: HCPCS | Performed by: PODIATRIST

## 2020-06-02 PROCEDURE — 25010000002 MIDAZOLAM PER 1 MG: Performed by: NURSE ANESTHETIST, CERTIFIED REGISTERED

## 2020-06-02 PROCEDURE — 76000 FLUOROSCOPY <1 HR PHYS/QHP: CPT | Performed by: RADIOLOGY

## 2020-06-02 PROCEDURE — 25010000003 LIDOCAINE 1 % SOLUTION: Performed by: PODIATRIST

## 2020-06-02 PROCEDURE — 76000 FLUOROSCOPY <1 HR PHYS/QHP: CPT

## 2020-06-02 PROCEDURE — 25010000002 HYDROMORPHONE PER 4 MG: Performed by: NURSE ANESTHETIST, CERTIFIED REGISTERED

## 2020-06-02 PROCEDURE — C1713 ANCHOR/SCREW BN/BN,TIS/BN: HCPCS | Performed by: PODIATRIST

## 2020-06-02 PROCEDURE — 25010000002 ONDANSETRON PER 1 MG: Performed by: NURSE ANESTHETIST, CERTIFIED REGISTERED

## 2020-06-02 PROCEDURE — 25010000003 CEFAZOLIN SODIUM-DEXTROSE 2-3 GM-%(50ML) RECONSTITUTED SOLUTION: Performed by: PODIATRIST

## 2020-06-02 DEVICE — HEADLESS COMPRESSION SCREW
Type: IMPLANTABLE DEVICE | Site: FOOT | Status: NON-FUNCTIONAL
Brand: FIXOS
Removed: 2021-02-23

## 2020-06-02 DEVICE — WAX BONE HEMO NAT 2.5G: Type: IMPLANTABLE DEVICE | Site: FOOT | Status: FUNCTIONAL

## 2020-06-02 RX ORDER — MIDAZOLAM HYDROCHLORIDE 1 MG/ML
2 INJECTION INTRAMUSCULAR; INTRAVENOUS
Status: DISCONTINUED | OUTPATIENT
Start: 2020-06-02 | End: 2020-06-02 | Stop reason: HOSPADM

## 2020-06-02 RX ORDER — MIDAZOLAM HYDROCHLORIDE 1 MG/ML
INJECTION INTRAMUSCULAR; INTRAVENOUS AS NEEDED
Status: DISCONTINUED | OUTPATIENT
Start: 2020-06-02 | End: 2020-06-02 | Stop reason: SURG

## 2020-06-02 RX ORDER — MAGNESIUM HYDROXIDE 1200 MG/15ML
LIQUID ORAL AS NEEDED
Status: DISCONTINUED | OUTPATIENT
Start: 2020-06-02 | End: 2020-06-02 | Stop reason: HOSPADM

## 2020-06-02 RX ORDER — IPRATROPIUM BROMIDE AND ALBUTEROL SULFATE 2.5; .5 MG/3ML; MG/3ML
3 SOLUTION RESPIRATORY (INHALATION) ONCE AS NEEDED
Status: DISCONTINUED | OUTPATIENT
Start: 2020-06-02 | End: 2020-06-02 | Stop reason: HOSPADM

## 2020-06-02 RX ORDER — KETOROLAC TROMETHAMINE 30 MG/ML
INJECTION, SOLUTION INTRAMUSCULAR; INTRAVENOUS AS NEEDED
Status: DISCONTINUED | OUTPATIENT
Start: 2020-06-02 | End: 2020-06-02 | Stop reason: SURG

## 2020-06-02 RX ORDER — SODIUM CHLORIDE 0.9 % (FLUSH) 0.9 %
10 SYRINGE (ML) INJECTION EVERY 12 HOURS SCHEDULED
Status: DISCONTINUED | OUTPATIENT
Start: 2020-06-02 | End: 2020-06-02 | Stop reason: HOSPADM

## 2020-06-02 RX ORDER — LIDOCAINE HYDROCHLORIDE 20 MG/ML
INJECTION, SOLUTION INFILTRATION; PERINEURAL AS NEEDED
Status: DISCONTINUED | OUTPATIENT
Start: 2020-06-02 | End: 2020-06-02 | Stop reason: SURG

## 2020-06-02 RX ORDER — OXYCODONE HYDROCHLORIDE AND ACETAMINOPHEN 5; 325 MG/1; MG/1
2 TABLET ORAL EVERY 4 HOURS PRN
Qty: 60 TABLET | Refills: 0 | OUTPATIENT
Start: 2020-06-02 | End: 2020-06-23

## 2020-06-02 RX ORDER — LIDOCAINE HYDROCHLORIDE 10 MG/ML
INJECTION, SOLUTION INFILTRATION; PERINEURAL AS NEEDED
Status: DISCONTINUED | OUTPATIENT
Start: 2020-06-02 | End: 2020-06-02 | Stop reason: HOSPADM

## 2020-06-02 RX ORDER — ONDANSETRON 2 MG/ML
INJECTION INTRAMUSCULAR; INTRAVENOUS AS NEEDED
Status: DISCONTINUED | OUTPATIENT
Start: 2020-06-02 | End: 2020-06-02 | Stop reason: SURG

## 2020-06-02 RX ORDER — ONDANSETRON 4 MG/1
4 TABLET, FILM COATED ORAL EVERY 8 HOURS PRN
Qty: 20 TABLET | Refills: 0 | Status: ON HOLD | OUTPATIENT
Start: 2020-06-02 | End: 2020-06-28

## 2020-06-02 RX ORDER — BUPIVACAINE HYDROCHLORIDE 5 MG/ML
INJECTION, SOLUTION PERINEURAL AS NEEDED
Status: DISCONTINUED | OUTPATIENT
Start: 2020-06-02 | End: 2020-06-02 | Stop reason: HOSPADM

## 2020-06-02 RX ORDER — OXYCODONE HYDROCHLORIDE AND ACETAMINOPHEN 5; 325 MG/1; MG/1
1 TABLET ORAL ONCE AS NEEDED
Status: DISCONTINUED | OUTPATIENT
Start: 2020-06-02 | End: 2020-06-02 | Stop reason: HOSPADM

## 2020-06-02 RX ORDER — FENTANYL CITRATE 50 UG/ML
50 INJECTION, SOLUTION INTRAMUSCULAR; INTRAVENOUS
Status: DISCONTINUED | OUTPATIENT
Start: 2020-06-02 | End: 2020-06-02 | Stop reason: HOSPADM

## 2020-06-02 RX ORDER — SODIUM CHLORIDE 0.9 % (FLUSH) 0.9 %
10 SYRINGE (ML) INJECTION AS NEEDED
Status: DISCONTINUED | OUTPATIENT
Start: 2020-06-02 | End: 2020-06-02 | Stop reason: HOSPADM

## 2020-06-02 RX ORDER — MEPERIDINE HYDROCHLORIDE 25 MG/ML
12.5 INJECTION INTRAMUSCULAR; INTRAVENOUS; SUBCUTANEOUS
Status: DISCONTINUED | OUTPATIENT
Start: 2020-06-02 | End: 2020-06-02 | Stop reason: HOSPADM

## 2020-06-02 RX ORDER — FAMOTIDINE 10 MG/ML
INJECTION, SOLUTION INTRAVENOUS AS NEEDED
Status: DISCONTINUED | OUTPATIENT
Start: 2020-06-02 | End: 2020-06-02 | Stop reason: SURG

## 2020-06-02 RX ORDER — FENTANYL CITRATE 50 UG/ML
INJECTION, SOLUTION INTRAMUSCULAR; INTRAVENOUS AS NEEDED
Status: DISCONTINUED | OUTPATIENT
Start: 2020-06-02 | End: 2020-06-02 | Stop reason: SURG

## 2020-06-02 RX ORDER — MIDAZOLAM HYDROCHLORIDE 1 MG/ML
1 INJECTION INTRAMUSCULAR; INTRAVENOUS
Status: DISCONTINUED | OUTPATIENT
Start: 2020-06-02 | End: 2020-06-02 | Stop reason: HOSPADM

## 2020-06-02 RX ORDER — PROPOFOL 10 MG/ML
VIAL (ML) INTRAVENOUS AS NEEDED
Status: DISCONTINUED | OUTPATIENT
Start: 2020-06-02 | End: 2020-06-02 | Stop reason: SURG

## 2020-06-02 RX ORDER — CEFAZOLIN SODIUM 2 G/50ML
2 SOLUTION INTRAVENOUS ONCE
Status: COMPLETED | OUTPATIENT
Start: 2020-06-02 | End: 2020-06-02

## 2020-06-02 RX ORDER — ACETAMINOPHEN 160 MG
TABLET,DISINTEGRATING ORAL AS NEEDED
Status: DISCONTINUED | OUTPATIENT
Start: 2020-06-02 | End: 2020-06-02 | Stop reason: HOSPADM

## 2020-06-02 RX ORDER — ONDANSETRON 2 MG/ML
4 INJECTION INTRAMUSCULAR; INTRAVENOUS AS NEEDED
Status: DISCONTINUED | OUTPATIENT
Start: 2020-06-02 | End: 2020-06-02 | Stop reason: HOSPADM

## 2020-06-02 RX ORDER — HYDROMORPHONE HCL 110MG/55ML
PATIENT CONTROLLED ANALGESIA SYRINGE INTRAVENOUS AS NEEDED
Status: DISCONTINUED | OUTPATIENT
Start: 2020-06-02 | End: 2020-06-02 | Stop reason: SURG

## 2020-06-02 RX ORDER — SODIUM CHLORIDE, SODIUM LACTATE, POTASSIUM CHLORIDE, CALCIUM CHLORIDE 600; 310; 30; 20 MG/100ML; MG/100ML; MG/100ML; MG/100ML
125 INJECTION, SOLUTION INTRAVENOUS CONTINUOUS
Status: DISCONTINUED | OUTPATIENT
Start: 2020-06-02 | End: 2020-06-02 | Stop reason: HOSPADM

## 2020-06-02 RX ADMIN — LIDOCAINE HYDROCHLORIDE 40 MG: 20 INJECTION, SOLUTION INFILTRATION; PERINEURAL at 11:02

## 2020-06-02 RX ADMIN — CEFAZOLIN SODIUM 2 G: 2 SOLUTION INTRAVENOUS at 10:55

## 2020-06-02 RX ADMIN — ONDANSETRON 4 MG: 2 INJECTION INTRAMUSCULAR; INTRAVENOUS at 14:21

## 2020-06-02 RX ADMIN — SODIUM CHLORIDE, POTASSIUM CHLORIDE, SODIUM LACTATE AND CALCIUM CHLORIDE: 600; 310; 30; 20 INJECTION, SOLUTION INTRAVENOUS at 14:30

## 2020-06-02 RX ADMIN — FENTANYL CITRATE 100 MCG: 50 INJECTION INTRAMUSCULAR; INTRAVENOUS at 11:02

## 2020-06-02 RX ADMIN — PROPOFOL 200 MG: 10 INJECTION, EMULSION INTRAVENOUS at 10:58

## 2020-06-02 RX ADMIN — HYDROMORPHONE HYDROCHLORIDE 0.5 MG: 2 INJECTION, SOLUTION INTRAMUSCULAR; INTRAVENOUS; SUBCUTANEOUS at 13:38

## 2020-06-02 RX ADMIN — MIDAZOLAM HYDROCHLORIDE 2 MG: 1 INJECTION, SOLUTION INTRAMUSCULAR; INTRAVENOUS at 10:17

## 2020-06-02 RX ADMIN — HYDROMORPHONE HYDROCHLORIDE 0.5 MG: 2 INJECTION, SOLUTION INTRAMUSCULAR; INTRAVENOUS; SUBCUTANEOUS at 13:57

## 2020-06-02 RX ADMIN — MIDAZOLAM HYDROCHLORIDE 2 MG: 1 INJECTION, SOLUTION INTRAMUSCULAR; INTRAVENOUS at 10:55

## 2020-06-02 RX ADMIN — FAMOTIDINE 20 MG: 10 INJECTION INTRAVENOUS at 10:55

## 2020-06-02 RX ADMIN — HYDROMORPHONE HYDROCHLORIDE 0.5 MG: 2 INJECTION, SOLUTION INTRAMUSCULAR; INTRAVENOUS; SUBCUTANEOUS at 12:46

## 2020-06-02 RX ADMIN — HYDROMORPHONE HYDROCHLORIDE 0.5 MG: 2 INJECTION, SOLUTION INTRAMUSCULAR; INTRAVENOUS; SUBCUTANEOUS at 14:09

## 2020-06-02 RX ADMIN — SODIUM CHLORIDE, POTASSIUM CHLORIDE, SODIUM LACTATE AND CALCIUM CHLORIDE 125 ML/HR: 600; 310; 30; 20 INJECTION, SOLUTION INTRAVENOUS at 10:14

## 2020-06-02 RX ADMIN — KETOROLAC TROMETHAMINE 30 MG: 30 INJECTION, SOLUTION INTRAMUSCULAR; INTRAVENOUS at 14:21

## 2020-06-02 NOTE — ANESTHESIA POSTPROCEDURE EVALUATION
Patient: Carlo Wellington    Procedure Summary     Date:  06/02/20 Room / Location:   [facility] 01 /  [facility]    Anesthesia Start:  1053 Anesthesia Stop:  1430    Procedures:       ANKLE SUBTALAR ARTHRODESIS (Right Ankle)      HARDWARE REMOVAL (Right Ankle)      FOOT PLANTAR FASCIECTOMY (Right Foot) Diagnosis:       Posterior tibial tendon dysfunction (PTTD) of right lower extremity      Painful orthopaedic hardware (CMS/HCC)      Plantar fasciitis of right foot      (Posterior tibial tendon dysfunction (PTTD) of right lower extremity [M76.821])      (Painful orthopaedic hardware (CMS/HCC) [T84.84XA])      (Plantar fasciitis of right foot [M72.2])    Surgeon:  Sybil Mortensen DPM Provider:  Dung Freeman MD    Anesthesia Type:  general ASA Status:  2          Anesthesia Type: general    Vitals  Vitals Value Taken Time   /92 6/2/2020  3:01 PM   Temp 97 °F (36.1 °C) 6/2/2020  2:31 PM   Pulse 66 6/2/2020  3:01 PM   Resp 12 6/2/2020  3:01 PM   SpO2 98 % 6/2/2020  3:01 PM           Post Anesthesia Care and Evaluation    Patient location during evaluation: PHASE II  Patient participation: complete - patient participated  Level of consciousness: awake and alert  Pain score: 1  Pain management: adequate  Airway patency: patent  Anesthetic complications: No anesthetic complications  PONV Status: controlled  Cardiovascular status: acceptable  Respiratory status: acceptable  Hydration status: acceptable

## 2020-06-02 NOTE — ANESTHESIA PROCEDURE NOTES
Airway  Urgency: elective    Date/Time: 6/2/2020 10:59 AM    General Information and Staff    Patient location during procedure: OR  CRNA: Christianne Soliz CRNA    Indications and Patient Condition    Preoxygenated: yes  Mask difficulty assessment: 1 - vent by mask    Final Airway Details  Final airway type: supraglottic airway      Successful airway: unique  Size 5    Number of attempts at approach: 1  Assessment: lips, teeth, and gum same as pre-op and atraumatic intubation

## 2020-06-02 NOTE — OP NOTE
Carlo Amish  6/2/2020      Operative Progress Note:    Surgeon and Assistant: Dr. Sybil Mortensen DPM    Pre-Operative Diagnosis:   1.  Subtalar joint instability right foot  2.  Painful orthopedic hardware right foot    Post-Operative Diagnosis:   1.  Subtalar joint instability right foot  2.  Painful orthopedic hardware right foot    Procedure(s):    1.  Subtalar joint arthrodesis  2.  Removal of hardware right great toe    Type of Anesthesia Administered: General with local ankle block consisting of 10cc 50:50 mix 1% lidocaine and 0.5% marcaine    Estimated Blood Loss: 5mL    Hemostasis: Thigh tourniquet inflated to 350mmHg    Blood Products: None    Specimen Obtained/Removed: Staple removed from right great toe.  Arthroeresis implant removed from sinus tarsi.    Complication(s):  None    Graft/Implant/Prosthetics/Implanted Device/Transplants: 7.0 mm Juju headless compression screws    Indication: 44-year-old male with no significant past medical history has chronic right foot pain secondary to subtalar joint instability and pes planovalgus deformity.  Approximately 1 year ago he underwent subtalar joint arthroeresis and bunion correction.  He continued to have right rear foot pain as well as pain over the hardware in his hallux proximal phalanx despite attempting multiple conservative treatments including shoe gear modifications, injections, orthotics.  He elects today to proceed with a subtalar joint arthrodesis as well as removal of the hardware in his right great toe after thorough discussion of alternatives, risks, benefits.    Findings: Right foot subtalar joint instability    Operative Report:  Patient was identified in the preoperative holding area, formal consent was signed, and the right lower extremity was marked as correct site.  Patient was brought to the operating suite placed in the operating table in the supine position.  Timeout was performed and he underwent anesthesia as dictated above.  A  well-padded thigh tourniquet was applied.  His right lower extremity was prepped and draped in the usual sterile manner.  A second preprocedure timeout was performed.  Attention was then focused to his right great toe where an incision was made using a 15 blade over the previous surgical scar.  Dissection took place and the recently placed staple was identified.  This was loosened using a [place] elevator and removed from the bone.  The surgical site was copiously irrigated with sterile saline and closed in layers using 3-0 Monocryl and 3-0 nylon.  Next Esmarch exsanguination was utilized and the tourniquet was inflated.  Attention was focused to the lateral subtalar joint.  An incision was made using a 15 blade starting at the previous arthroeresis incision extending posterior to just distal to the lateral malleolus.  Bovie hemostasis was used as needed as layered dissection took place until the peroneal tendons were identified.  These were retracted plantarly.  Next the extensor digitorum brevis was identified and reflected distally.  The previously placed sinus tarsi implant was identified and removed.  Next the calcaneofibular ligament was released and the posterior subtalar joint surface was identified.  Osteotomes were utilized to denude the cartilage on the lateral aspect of the joint.  Lamina spreaders were utilized to further visualize the medial aspect of the joint and osteotomes were again used to denude the remaining cartilage on the facing sides of the posterior subtalar joint surface.  Power bur was utilized to remove any remaining cartilage until subchondral bone was identified.  A drill was then used to fenestrate the facing sides of the joint.  The calcaneus was then placed in a neutral to slight valgus position and fluoroscopy was used to assist a guidewire for the 7.0 millimeter screws was placed in the posterior medial aspect of the calcaneus into the talar body and also from the posterior lateral  aspect of the calcaneus into the talar body.  Correct position of these screws as well as calcaneus was confirmed using fluoroscopy.  All incisions were closed in layers using 3-0 Monocryl and 3-0 nylon.  A postoperative ankle block consisting of 10 mL 0.5% Marcaine plain was performed.  The tourniquet was deflated there was noted to be brisk capillary refill to all digits.  A dry sterile dressing as well as a posterior splint was applied.  The patient was extubated and transferred to PACU with vital signs stable and vascular status remaining intact to the right lower extremity.  He will be discharged home today with instructions to be nonweightbearing to the right lower extremity.      Electronically Signed by: Sybil Mortensen DPM        Dictated Utilizing Dragon Dictation

## 2020-06-02 NOTE — H&P
PODIATRIC SURGERY  History and Physical      Principal problem: Plantar fasciitis, right    Subjective .     History of present illness:    Mr. Carlo Wellington is a 44 y.o. years old male with no significant past medical history. He has had chronic right foot pain secondary to subtalar joint instability and bunion for many years. He underwent surgical bunion treatment and subtalar joint arthroeresis approximately one year ago. He continues to have right rearfoot pain. He also has pain associated with the hardware fixation in his hallux proximal phalanx. He has continued pain despite attempting multiple conservative treatments including shoe gear modifications, injections, padding, and medications. He presents today for surgical treatment. No changes since last seen as outpatient.     History taken from: patient    Case was discussed with patient    Review of Systems    Constitutional: no fever, chills and night sweats. No appetite change or unexpected weight change. No fatigue.  Eyes: no eye drainage, itching or redness.  HEENT: no mouth sores, dysphagia or nose bleed.  Respiratory: no for shortness of breath, cough or production of sputum.  Cardiovascular: no chest pain, no palpitations, no orthopnea.  Gastrointestinal: no nausea, vomiting or diarrhea. No abdominal pain, hematemesis or rectal bleeding.  Genitourinary: no dysuria or polyuria.  Hematologic/lymphatic: no lymph node abnormalities, no easy bruising or easy bleeding.  Musculoskeletal: no muscle or joint pain.  Skin: No rash and no itching.  Neurological: no loss of consciousness, no seizure, no headache.  Behavioral/Psych: no depression or suicidal ideation.  Endocrine: no hot flashes.  Immunologic: negative.    Past Medical History    Past Medical History:   Diagnosis Date   • Back pain    • Bunion    • Depression    • Knee pain, left    • Lumbar disc herniation     X 2   • Lumbar spondylosis 11/5/2019       Past Surgical History    Past Surgical  History:   Procedure Laterality Date   • BUNIONECTOMY Right 6/18/2019    Procedure: BUNIONECTOMY;  Surgeon: Sybil Mortensen DPM;  Location:  [facility] OR;  Service: Podiatry   • BUNIONECTOMY Left 8/20/2019    Procedure: BUNIONECTOMY;  Surgeon: Sybil Mortensen DPM;  Location:  [facility] OR;  Service: Podiatry   • GASTROCNEMIUS RECESSION Left 8/20/2019    Procedure: RECESSION GASTROCNEMIUS;  Surgeon: Sybil Mortensen DPM;  Location:  [facility] OR;  Service: Podiatry   • MEDIAL BRANCH BLOCK Right 11/13/2019    Procedure: LUMBAR MEDIAL BRANCH BLOCK RIGHT 3 LEVEL L3/4, L4/5, L5/S1;  Surgeon: Augustine Houston DO;  Location:  [facility] OR;  Service: Pain Management   • MEDIAL BRANCH BLOCK Right 12/18/2019    Procedure: RIGHT LUMBAR MEDIAL BRANCH BLOCK 3 LEVEL L3/4, L4/5, L5/S1;  Surgeon: Augustine Houston DO;  Location:  [facility] OR;  Service: Pain Management   • RADIOFREQUENCY ABLATION Right 2/12/2020    Procedure: RADIOFREQUENCY ABLATION LUMBAR L3-S1 Right side only;  Surgeon: Augustine Houston DO;  Location:  [facility] OR;  Service: Pain Management;  Laterality: Right;   • SUBTALAR ARTHRODESIS Right 6/18/2019    Procedure: SUBTALAR ARTHROEREISIS, GASTROC RECESSION;  Surgeon: Sybil Mortensen DPM;  Location: [facility] OR;  Service: Podiatry   • SUBTALAR ARTHRODESIS Left 8/20/2019    Procedure: SUBTALAR ARTHROESIS;  Surgeon: Sybil Mortensen DPM;  Location: [facility] OR;  Service: Podiatry       Family History    Family History   Problem Relation Age of Onset   • Arthritis Mother    • No Known Problems Father        Social History    Social History     Tobacco Use   • Smoking status: Current Every Day Smoker     Packs/day: 0.50     Years: 29.00     Pack years: 14.50     Types: Electronic Cigarette, Cigarettes   • Smokeless tobacco: Never Used   Substance Use Topics   • Alcohol use: Yes     Frequency: Never     Comment: OCASSIONALLY   • Drug use: No       Allergies    Patient has no known allergies.    Objective     There were no vitals  taken for this visit.           No intake or output data in the 24 hours ending 06/02/20 0940      Physical Exam:     General Appearance:    Alert, cooperative, in no acute distress   Head:    Normocephalic, without obvious abnormality, atraumatic    Heart:    Regular rhythm and normal rate     Chest Wall:    No abnormalities observed   Abdomen:    Soft non-tender, non-distended, no guarding, no rebound                tenderness   Extremities:   Moves all extremities well, no edema, no cyanosis, no             redness   Pulses:   Pulses palpable and equal bilaterally   Skin:   No bleeding, bruising or rash   Lymph nodes:   No palpable adenopathy               Results from last 7 days   Lab Units 05/29/20  0932   WBC 10*3/mm3 7.45     Lab Results   Component Value Date    NEUTROABS 3.66 05/29/2020       Results from last 7 days   Lab Units 05/29/20  0932   CREATININE mg/dL 1.02             Imaging Results (Last 24 Hours)     ** No results found for the last 24 hours. **            Results Review:    I have personally reviewed laboratory data, culture results, radiology studies and antimicrobial therapy.    Hospital Medications (active)       Dose Frequency Start End    ceFAZolin Sodium-Dextrose (ANCEF) IVPB (duplex) 2 g 2 g Once 6/2/2020     Admin Instructions: Administer Within 1 Hour of Surgical Incision.<BR>Redose 4 Hours From Pre-Op Dose if Procedure Ongoing or Blood Loss Greater Than 1.5 L<BR>Caution: Look alike/sound alike drug alert    Route: Intravenous            PROBLEM LIST:    Patient Active Problem List   Diagnosis   • Herniation of intervertebral disc of lumbar spine   • Bunion of right foot   • Pes planus of right foot   • Instability of right foot joint   • Right foot pain   • Bunion, left   • Equinus contracture of left ankle   • Instability of left foot joint   • Left foot pain   • Lumbar spondylosis   • Plantar fasciitis, right   • Posterior tibial tendon dysfunction (PTTD) of right lower extremity    • Painful orthopaedic hardware (CMS/HCC)   • Plantar fasciitis of right foot       Assessment/Plan     ASSESSMENT:    Right foot subtalar joint instability and right hallux painful hardware    PLAN:    -Proceeding with surgical treatment after thorough discussion of all alternatives, risks, and benefits.      Patients findings and recommendations were discussed with patient    Code Status:   There are no questions and answers to display.       Sybil Mortensen DPM  06/02/20  09:40

## 2020-06-23 ENCOUNTER — HOSPITAL ENCOUNTER (EMERGENCY)
Facility: HOSPITAL | Age: 45
Discharge: HOME | End: 2020-06-23
Attending: FAMILY MEDICINE | Admitting: EMERGENCY MEDICINE

## 2020-06-23 ENCOUNTER — APPOINTMENT (OUTPATIENT)
Dept: CT IMAGING | Facility: HOSPITAL | Age: 45
End: 2020-06-23

## 2020-06-23 VITALS
HEART RATE: 80 BPM | RESPIRATION RATE: 18 BRPM | OXYGEN SATURATION: 100 % | DIASTOLIC BLOOD PRESSURE: 70 MMHG | TEMPERATURE: 98.6 F | HEIGHT: 71 IN | SYSTOLIC BLOOD PRESSURE: 120 MMHG | WEIGHT: 212 LBS | BODY MASS INDEX: 29.68 KG/M2

## 2020-06-23 DIAGNOSIS — L03.119 CELLULITIS OF FOOT: ICD-10-CM

## 2020-06-23 DIAGNOSIS — M76.821 POSTERIOR TIBIAL TENDON DYSFUNCTION (PTTD) OF RIGHT LOWER EXTREMITY: ICD-10-CM

## 2020-06-23 DIAGNOSIS — M25.374 INSTABILITY OF RIGHT FOOT JOINT: ICD-10-CM

## 2020-06-23 DIAGNOSIS — T81.41XA INFECTION OF SUPERFICIAL INCISIONAL SURGICAL SITE AFTER PROCEDURE, INITIAL ENCOUNTER: Primary | ICD-10-CM

## 2020-06-23 DIAGNOSIS — M79.671 RIGHT FOOT PAIN: ICD-10-CM

## 2020-06-23 DIAGNOSIS — T84.84XA PAINFUL ORTHOPAEDIC HARDWARE (HCC): ICD-10-CM

## 2020-06-23 DIAGNOSIS — M21.41 PES PLANUS OF RIGHT FOOT: ICD-10-CM

## 2020-06-23 LAB
ALBUMIN SERPL-MCNC: 3.96 G/DL (ref 3.5–5.2)
ALBUMIN/GLOB SERPL: 1.1 G/DL
ALP SERPL-CCNC: 76 U/L (ref 39–117)
ALT SERPL W P-5'-P-CCNC: 38 U/L (ref 1–41)
ANION GAP SERPL CALCULATED.3IONS-SCNC: 9 MMOL/L (ref 5–15)
AST SERPL-CCNC: 32 U/L (ref 1–40)
BASOPHILS # BLD AUTO: 0.01 10*3/MM3 (ref 0–0.2)
BASOPHILS NFR BLD AUTO: 0.2 % (ref 0–1.5)
BILIRUB SERPL-MCNC: <0.2 MG/DL (ref 0.2–1.2)
BUN BLD-MCNC: 11 MG/DL (ref 6–20)
BUN/CREAT SERPL: 10 (ref 7–25)
CALCIUM SPEC-SCNC: 9.4 MG/DL (ref 8.6–10.5)
CHLORIDE SERPL-SCNC: 105 MMOL/L (ref 98–107)
CO2 SERPL-SCNC: 26 MMOL/L (ref 22–29)
CREAT BLD-MCNC: 1.1 MG/DL (ref 0.76–1.27)
CRP SERPL-MCNC: 0.29 MG/DL (ref 0–0.5)
D-LACTATE SERPL-SCNC: 1.1 MMOL/L (ref 0.5–2)
DEPRECATED RDW RBC AUTO: 44 FL (ref 37–54)
EOSINOPHIL # BLD AUTO: 0.2 10*3/MM3 (ref 0–0.4)
EOSINOPHIL NFR BLD AUTO: 3.4 % (ref 0.3–6.2)
ERYTHROCYTE [DISTWIDTH] IN BLOOD BY AUTOMATED COUNT: 14.5 % (ref 12.3–15.4)
GFR SERPL CREATININE-BSD FRML MDRD: 89 ML/MIN/1.73
GLOBULIN UR ELPH-MCNC: 3.6 GM/DL
GLUCOSE BLD-MCNC: 109 MG/DL (ref 65–99)
HCT VFR BLD AUTO: 39.2 % (ref 37.5–51)
HGB BLD-MCNC: 12.4 G/DL (ref 13–17.7)
HYPOCHROMIA BLD QL: NORMAL
IMM GRANULOCYTES # BLD AUTO: 0.01 10*3/MM3 (ref 0–0.05)
IMM GRANULOCYTES NFR BLD AUTO: 0.2 % (ref 0–0.5)
LARGE PLATELETS: NORMAL
LYMPHOCYTES # BLD AUTO: 2.64 10*3/MM3 (ref 0.7–3.1)
LYMPHOCYTES NFR BLD AUTO: 45.1 % (ref 19.6–45.3)
MCH RBC QN AUTO: 26.3 PG (ref 26.6–33)
MCHC RBC AUTO-ENTMCNC: 31.6 G/DL (ref 31.5–35.7)
MCV RBC AUTO: 83.2 FL (ref 79–97)
MONOCYTES # BLD AUTO: 0.56 10*3/MM3 (ref 0.1–0.9)
MONOCYTES NFR BLD AUTO: 9.6 % (ref 5–12)
NEUTROPHILS # BLD AUTO: 2.44 10*3/MM3 (ref 1.7–7)
NEUTROPHILS NFR BLD AUTO: 41.5 % (ref 42.7–76)
NRBC BLD AUTO-RTO: 0 /100 WBC (ref 0–0.2)
PLATELET # BLD AUTO: 234 10*3/MM3 (ref 140–450)
PMV BLD AUTO: 10.9 FL (ref 6–12)
POTASSIUM BLD-SCNC: 3.8 MMOL/L (ref 3.5–5.2)
PROT SERPL-MCNC: 7.6 G/DL (ref 6–8.5)
RBC # BLD AUTO: 4.71 10*6/MM3 (ref 4.14–5.8)
SODIUM BLD-SCNC: 140 MMOL/L (ref 136–145)
WBC NRBC COR # BLD: 5.86 10*3/MM3 (ref 3.4–10.8)

## 2020-06-23 PROCEDURE — 25010000002 VANCOMYCIN 5 G RECONSTITUTED SOLUTION: Performed by: FAMILY MEDICINE

## 2020-06-23 PROCEDURE — 96376 TX/PRO/DX INJ SAME DRUG ADON: CPT

## 2020-06-23 PROCEDURE — 25010000002 ONDANSETRON PER 1 MG: Performed by: FAMILY MEDICINE

## 2020-06-23 PROCEDURE — 96366 THER/PROPH/DIAG IV INF ADDON: CPT

## 2020-06-23 PROCEDURE — 25010000002 MORPHINE PER 10 MG: Performed by: FAMILY MEDICINE

## 2020-06-23 PROCEDURE — 83605 ASSAY OF LACTIC ACID: CPT | Performed by: FAMILY MEDICINE

## 2020-06-23 PROCEDURE — 87040 BLOOD CULTURE FOR BACTERIA: CPT | Performed by: FAMILY MEDICINE

## 2020-06-23 PROCEDURE — 0 IOVERSOL 74 % SOLUTION: Performed by: FAMILY MEDICINE

## 2020-06-23 PROCEDURE — 80053 COMPREHEN METABOLIC PANEL: CPT | Performed by: FAMILY MEDICINE

## 2020-06-23 PROCEDURE — 25010000002 PIPERACILLIN SOD-TAZOBACTAM PER 1 G: Performed by: FAMILY MEDICINE

## 2020-06-23 PROCEDURE — 99284 EMERGENCY DEPT VISIT MOD MDM: CPT

## 2020-06-23 PROCEDURE — 85007 BL SMEAR W/DIFF WBC COUNT: CPT | Performed by: FAMILY MEDICINE

## 2020-06-23 PROCEDURE — 73701 CT LOWER EXTREMITY W/DYE: CPT | Performed by: RADIOLOGY

## 2020-06-23 PROCEDURE — 25010000002 ONDANSETRON PER 1 MG: Performed by: EMERGENCY MEDICINE

## 2020-06-23 PROCEDURE — 73701 CT LOWER EXTREMITY W/DYE: CPT

## 2020-06-23 PROCEDURE — 96365 THER/PROPH/DIAG IV INF INIT: CPT

## 2020-06-23 PROCEDURE — 86140 C-REACTIVE PROTEIN: CPT | Performed by: FAMILY MEDICINE

## 2020-06-23 PROCEDURE — 25010000002 MORPHINE PER 10 MG: Performed by: EMERGENCY MEDICINE

## 2020-06-23 PROCEDURE — 96367 TX/PROPH/DG ADDL SEQ IV INF: CPT

## 2020-06-23 PROCEDURE — 85025 COMPLETE CBC W/AUTO DIFF WBC: CPT | Performed by: FAMILY MEDICINE

## 2020-06-23 PROCEDURE — 96375 TX/PRO/DX INJ NEW DRUG ADDON: CPT

## 2020-06-23 PROCEDURE — 36415 COLL VENOUS BLD VENIPUNCTURE: CPT

## 2020-06-23 RX ORDER — ONDANSETRON 2 MG/ML
4 INJECTION INTRAMUSCULAR; INTRAVENOUS ONCE
Status: COMPLETED | OUTPATIENT
Start: 2020-06-23 | End: 2020-06-23

## 2020-06-23 RX ORDER — CLINDAMYCIN HYDROCHLORIDE 300 MG/1
300 CAPSULE ORAL 3 TIMES DAILY
Qty: 42 CAPSULE | Refills: 0 | Status: ON HOLD | OUTPATIENT
Start: 2020-06-23 | End: 2020-06-28

## 2020-06-23 RX ORDER — OXYCODONE AND ACETAMINOPHEN 10; 325 MG/1; MG/1
1 TABLET ORAL EVERY 4 HOURS PRN
Qty: 12 TABLET | Refills: 0 | Status: ON HOLD | OUTPATIENT
Start: 2020-06-23 | End: 2020-06-28

## 2020-06-23 RX ORDER — SODIUM CHLORIDE 9 MG/ML
125 INJECTION, SOLUTION INTRAVENOUS CONTINUOUS
Status: DISCONTINUED | OUTPATIENT
Start: 2020-06-23 | End: 2020-06-23 | Stop reason: HOSPADM

## 2020-06-23 RX ORDER — SODIUM CHLORIDE 0.9 % (FLUSH) 0.9 %
10 SYRINGE (ML) INJECTION AS NEEDED
Status: DISCONTINUED | OUTPATIENT
Start: 2020-06-23 | End: 2020-06-23 | Stop reason: HOSPADM

## 2020-06-23 RX ADMIN — MORPHINE SULFATE 4 MG: 4 INJECTION, SOLUTION INTRAMUSCULAR; INTRAVENOUS at 06:35

## 2020-06-23 RX ADMIN — PIPERACILLIN AND TAZOBACTAM 3.38 G: 3; .375 INJECTION, POWDER, FOR SOLUTION INTRAVENOUS at 06:35

## 2020-06-23 RX ADMIN — SODIUM CHLORIDE 1000 ML: 9 INJECTION, SOLUTION INTRAVENOUS at 06:35

## 2020-06-23 RX ADMIN — IOVERSOL 100 ML: 741 INJECTION INTRA-ARTERIAL; INTRAVENOUS at 07:36

## 2020-06-23 RX ADMIN — VANCOMYCIN HYDROCHLORIDE 2000 MG: 5 INJECTION, POWDER, LYOPHILIZED, FOR SOLUTION INTRAVENOUS at 08:57

## 2020-06-23 RX ADMIN — ONDANSETRON 4 MG: 2 INJECTION INTRAMUSCULAR; INTRAVENOUS at 06:35

## 2020-06-23 RX ADMIN — MORPHINE SULFATE 4 MG: 4 INJECTION, SOLUTION INTRAMUSCULAR; INTRAVENOUS at 11:40

## 2020-06-23 RX ADMIN — ONDANSETRON 4 MG: 2 INJECTION INTRAMUSCULAR; INTRAVENOUS at 11:40

## 2020-06-23 RX ADMIN — SODIUM CHLORIDE 125 ML/HR: 9 INJECTION, SOLUTION INTRAVENOUS at 08:56

## 2020-06-23 NOTE — ED PROVIDER NOTES
Subjective   Patient is a 44-year-old male who presents emergency department complaining of right foot pain.  The patient states that 3 weeks ago he had surgery on his right foot.  On June 2, 2020 the patient had surgery with Dr. Mortensen.  His surgery was a right subtalar arthrodesis, and a right great toe hardware removal.  The patient states that he was healing well after the surgery but a few days ago he noticed that he started having some drainage and pus from the wound.  The patient states that on Wednesday he had his sutures removed, and 2 days after this is when he started noticing drainage, increased pain and swelling.  The patient states that the foot is becoming increasingly more painful and he is unable to bear weight on it at this time.  He states that his foot surgeon is currently out of town.  The patient denies any fever, chills, nausea or vomiting.          Review of Systems   Constitutional: Negative for activity change, appetite change, chills, diaphoresis, fatigue and fever.   HENT: Negative for congestion, postnasal drip, rhinorrhea, sinus pressure, sinus pain, sneezing and sore throat.    Eyes: Negative for discharge and itching.   Respiratory: Negative for apnea, cough, choking, chest tightness, shortness of breath and stridor.    Cardiovascular: Negative for chest pain, palpitations and leg swelling.   Gastrointestinal: Negative for abdominal distention, blood in stool, constipation, diarrhea, nausea and vomiting.   Genitourinary: Negative for difficulty urinating and flank pain.   Musculoskeletal: Negative for arthralgias and back pain.   Neurological: Negative for dizziness and light-headedness.   Psychiatric/Behavioral: Negative for agitation and confusion.       Past Medical History:   Diagnosis Date   • Back pain    • Bunion    • Depression    • Knee pain, left    • Lumbar disc herniation     X 2   • Lumbar spondylosis 11/5/2019       No Known Allergies    Past Surgical History:   Procedure  Laterality Date   • BUNIONECTOMY Right 6/18/2019    Procedure: BUNIONECTOMY;  Surgeon: Sybil Mortensen DPM;  Location:  [facility];  Service: Podiatry   • BUNIONECTOMY Left 8/20/2019    Procedure: BUNIONECTOMY;  Surgeon: Sybil Mortensen DPM;  Location:  [facility];  Service: Podiatry   • GASTROCNEMIUS RECESSION Left 8/20/2019    Procedure: RECESSION GASTROCNEMIUS;  Surgeon: Sybil Mortensen DPM;  [facility]:  [facility];  Service: Podiatry   • HARDWARE REMOVAL Right 6/2/2020    Procedure: HARDWARE REMOVAL;  Surgeon: Sybil Mortensen DPM;  Location:  [facility];  Service: Podiatry;  Laterality: Right;   • MEDIAL BRANCH BLOCK Right 11/13/2019    Procedure: LUMBAR MEDIAL BRANCH BLOCK RIGHT 3 LEVEL L3/4, L4/5, L5/S1;  Surgeon: Augustine Houston DO;  Location:  [facility];  Service: Pain Management   • MEDIAL BRANCH BLOCK Right 12/18/2019    Procedure: RIGHT LUMBAR MEDIAL BRANCH BLOCK 3 LEVEL L3/4, L4/5, L5/S1;  Surgeon: Augustine Houston DO;  Location:  [facility];  Service: Pain Management   • PLANTAR FASCIA RELEASE Right 6/2/2020    Procedure: FOOT PLANTAR FASCIECTOMY;  Surgeon: Sybil Mortensen DPM;  [facility]:  [facility];  Service: Podiatry;  Laterality: Right;   • RADIOFREQUENCY ABLATION Right 2/12/2020    Procedure: RADIOFREQUENCY ABLATION LUMBAR L3-S1 Right side only;  Surgeon: Augustine Houston DO;  [facility];  Service: Pain Management;  Laterality: Right;   • SUBTALAR ARTHRODESIS Right 6/18/2019    Procedure: SUBTALAR ARTHROEREISIS, GASTROC RECESSION;  Surgeon: Sybil Mortensen DPM;  [facility];  Service: Podiatry   • SUBTALAR ARTHRODESIS Left 8/20/2019    Procedure: SUBTALAR ARTHROESIS;  Surgeon: Sybil Mortensen DPM;  [facility];  Service: Podiatry   • SUBTALAR ARTHRODESIS Right 6/2/2020    Procedure: ANKLE SUBTALAR ARTHRODESIS;  Surgeon: Sybil Mortensen DPM;  [facility];  Service: Podiatry;  Laterality: Right;       Family History   Problem Relation Age of Onset   •  Arthritis Mother    • No Known Problems Father        Social History     Socioeconomic History   • Marital status: Single     Spouse name: Not on file   • Number of children: Not on file   • Years of education: Not on file   • Highest education level: Not on file   Tobacco Use   • Smoking status: Current Every Day Smoker     Packs/day: 0.25     Years: 29.00     Pack years: 7.25     Types: Electronic Cigarette, Cigarettes   • Smokeless tobacco: Never Used   Substance and Sexual Activity   • Alcohol use: Yes     Frequency: Never     Comment: OCASSIONALLY   • Drug use: No   • Sexual activity: Defer           Objective   Physical Exam   Constitutional: He appears well-developed and well-nourished. No distress.   HENT:   Head: Normocephalic and atraumatic.   Right Ear: External ear normal.   Left Ear: External ear normal.   Nose: Nose normal.   Mouth/Throat: Oropharynx is clear and moist. No oropharyngeal exudate.   Eyes: Pupils are equal, round, and reactive to light. Conjunctivae and EOM are normal. Right eye exhibits no discharge. Left eye exhibits no discharge. No scleral icterus.   Neck: Normal range of motion. Neck supple. No JVD present. No tracheal deviation present. No thyromegaly present.   Cardiovascular: Normal rate, regular rhythm, normal heart sounds and intact distal pulses. Exam reveals no gallop and no friction rub.   No murmur heard.  Pulmonary/Chest: Effort normal and breath sounds normal. No stridor. No respiratory distress. He has no wheezes. He has no rales.   Abdominal: Soft. Bowel sounds are normal. He exhibits no distension. There is no tenderness. There is no rebound and no guarding.   Musculoskeletal:   Right foot is edematous, with a well-healed surgical incision along the lateral aspect of the right foot just inferior to the lateral malleolus.  No drainage noted at this time.  Entire foot is tender to palpation especially along the dorsum of the foot.  Most tenderness is along the lateral  malleolus.  Dorsalis pedis pulses difficult to palpate secondary to edema.  Posterior tibial pulses easily palpable.  Neurovascularly he is intact.   Skin: Skin is warm and dry. He is not diaphoretic.   Psychiatric: He has a normal mood and affect. His behavior is normal.   Nursing note and vitals reviewed.      Procedures  Results for orders placed or performed during the hospital encounter of 06/23/20   Comprehensive Metabolic Panel   Result Value Ref Range    Glucose 109 (H) 65 - 99 mg/dL    BUN 11 6 - 20 mg/dL    Creatinine 1.10 0.76 - 1.27 mg/dL    Sodium 140 136 - 145 mmol/L    Potassium 3.8 3.5 - 5.2 mmol/L    Chloride 105 98 - 107 mmol/L    CO2 26.0 22.0 - 29.0 mmol/L    Calcium 9.4 8.6 - 10.5 mg/dL    Total Protein 7.6 6.0 - 8.5 g/dL    Albumin 3.96 3.50 - 5.20 g/dL    ALT (SGPT) 38 1 - 41 U/L    AST (SGOT) 32 1 - 40 U/L    Alkaline Phosphatase 76 39 - 117 U/L    Total Bilirubin <0.2 (L) 0.2 - 1.2 mg/dL    eGFR  African Amer 89 >60 mL/min/1.73    Globulin 3.6 gm/dL    A/G Ratio 1.1 g/dL    BUN/Creatinine Ratio 10.0 7.0 - 25.0    Anion Gap 9.0 5.0 - 15.0 mmol/L   Lactic Acid, Plasma   Result Value Ref Range    Lactate 1.1 0.5 - 2.0 mmol/L   C-reactive Protein   Result Value Ref Range    C-Reactive Protein 0.29 0.00 - 0.50 mg/dL   CBC Auto Differential   Result Value Ref Range    WBC 5.86 3.40 - 10.80 10*3/mm3    RBC 4.71 4.14 - 5.80 10*6/mm3    Hemoglobin 12.4 (L) 13.0 - 17.7 g/dL    Hematocrit 39.2 37.5 - 51.0 %    MCV 83.2 79.0 - 97.0 fL    MCH 26.3 (L) 26.6 - 33.0 pg    MCHC 31.6 31.5 - 35.7 g/dL    RDW 14.5 12.3 - 15.4 %    RDW-SD 44.0 37.0 - 54.0 fl    MPV 10.9 6.0 - 12.0 fL    Platelets 234 140 - 450 10*3/mm3    Neutrophil % 41.5 (L) 42.7 - 76.0 %    Lymphocyte % 45.1 19.6 - 45.3 %    Monocyte % 9.6 5.0 - 12.0 %    Eosinophil % 3.4 0.3 - 6.2 %    Basophil % 0.2 0.0 - 1.5 %    Immature Grans % 0.2 0.0 - 0.5 %    Neutrophils, Absolute 2.44 1.70 - 7.00 10*3/mm3    Lymphocytes, Absolute 2.64 0.70 - 3.10  10*3/mm3    Monocytes, Absolute 0.56 0.10 - 0.90 10*3/mm3    Eosinophils, Absolute 0.20 0.00 - 0.40 10*3/mm3    Basophils, Absolute 0.01 0.00 - 0.20 10*3/mm3    Immature Grans, Absolute 0.01 0.00 - 0.05 10*3/mm3    nRBC 0.0 0.0 - 0.2 /100 WBC   Scan Slide   Result Value Ref Range    Hypochromia Slight/1+ None Seen    Large Platelets Slight/1+ None Seen     Ct Lower Extremity Right With Contrast    Result Date: 6/23/2020  Narrative: EXAMINATION: CT LOWER EXTREMITY RIGHT W CONTRAST-  CLINICAL INDICATION: foot abscess/ swelling   COMPARISON: None immediately available  DOSE:  Radiation dose reduction techniques were utilized per ALARA protocol. Automated exposure control was initiated through either or "[employer] or D[employer] software packages by  protocol.      TECHNIQUE: Axial images were acquired through Risk I/O lower extremity duringIV contrast. Reformatted images were created.  FINDINGS: Today's exam demonstrates diffuse cellulitis involving the leg and foot. Subcutaneous edema is present.  There is no drainable fluid collection  Postoperative screws are noted extending through the calcaneus and talus. No definitive osteomyelitis is seen.      Impression: Appearance compatible with cellulitis and subcutaneous edema, but no abscess is seen.  This report was finalized on 6/23/2020 7:55 AM by Dr. Emiliano Bonilla MD.      Fl Surgery Fluoro    Result Date: 6/2/2020  Narrative: EXAMINATION: FL SURGERY FLUORO-  CLINICAL INDICATION: RT foot; M76.821-Posterior tibial tendinitis, right leg; T84.84XA-Pain due to internal orthopedic prosthetic devices, implants and grafts, initial encounter; M72.2-Plantar fascial fibromatosis   COMPARISON: None immediately available.  FINDINGS: Total fluoroscopy time 103.8 seconds.  Fluoroscopy was provided and 4 images were acquired during surgery.  For a full procedural report, please see the report provided by the performing physician.  This report was finalized on 6/2/2020 2:31  PM by Dr. Emiliano Bonilla MD.      Mammo Diagnostic Digital Tomosynthesis Bilateral With Cad    Result Date: 5/28/2020  Narrative: BILATERAL DIAGNOSTIC MAMMOGRAM WITH TOMOSYNTHESIS  CLINICAL INDICATION:  44-year-old male patient presents for a painful palpable area of concern in the right breast for approximately 2 weeks. He has no personal or reported family history of breast cancer.  TECHNIQUE: Low dose full field digital breast tomosynthesis imaging was performed with 2D and 3D acquisitions consisting of bilateral CC and MLO views.   COMPARISON: No prior exam is available for comparison.  FINDINGS: The breasts are heterogeneously dense, which may obscure small masses.  There is symmetric bilateral diffuse gynecomastia. No dominant mass, suspicious calcifications, or areas of architectural distortion are seen.      Impression: Findings are compatible with bilateral diffuse gynecomastia. Recommend clinical follow-up.   BI-RADS CATEGORY:  2, BENIGN   RECOMMENDATION:  Recommend clinical follow-up.  CAD was utilized.  The standard false-negative rate of mammography is between 10% and 25%. Complex patterns or increased breast density will markedly elevate the false-negative rate of mammography.    The patient was notified of the results and recommendations at the time of his visit.  Additionally, a letter, in lay terminology, with the results of this exam will be mailed to the patient.  This report was finalized on 5/28/2020 10:13 AM by Dr. Samira Domínguez MD.               ED Course  ED Course as of Jun 23 1424   Tue Jun 23, 2020   0920 Discussed results of work-up with patient.  No abscess noted on CT, but does have cellulitis.  I have reexamined the wound which does not show any fluctuance, discharge, or obvious erythema.  I have discussed with Dr. Mortensen.  Have offered the patient inpatient IV antibiotic treatment, but he states that he is not able to do that, because of taking care of his kids.  He understands the  risk of this including worsening infection, amputation, and death.  Will give outpatient clindamycin, as suggested by Dr. Mortensen.  Patient understands and agrees to return for any worsening symptoms.    [BC]      ED Course User Index  [BC] Stephon Junior MD                                           MDM  Number of Diagnoses or Management Options  Cellulitis of foot:   Infection of superficial incisional surgical site after procedure, initial encounter:      Amount and/or Complexity of Data Reviewed  Clinical lab tests: reviewed  Tests in the radiology section of CPT®: reviewed    Risk of Complications, Morbidity, and/or Mortality  Presenting problems: moderate  Diagnostic procedures: moderate  Management options: moderate        Final diagnoses:   Infection of superficial incisional surgical site after procedure, initial encounter   Cellulitis of foot            Stephon Junior MD  06/23/20 1427

## 2020-06-27 ENCOUNTER — APPOINTMENT (OUTPATIENT)
Dept: CT IMAGING | Facility: HOSPITAL | Age: 45
End: 2020-06-27

## 2020-06-27 ENCOUNTER — HOSPITAL ENCOUNTER (INPATIENT)
Facility: HOSPITAL | Age: 45
LOS: 3 days | Discharge: HOME | End: 2020-06-30
Attending: HOSPITALIST | Admitting: HOSPITALIST

## 2020-06-27 DIAGNOSIS — M79.671 RIGHT FOOT PAIN: ICD-10-CM

## 2020-06-27 DIAGNOSIS — L03.115 CELLULITIS OF RIGHT FOOT: Primary | ICD-10-CM

## 2020-06-27 PROBLEM — L03.119 CELLULITIS OF FOOT: Status: ACTIVE | Noted: 2020-06-27

## 2020-06-27 LAB
ALBUMIN SERPL-MCNC: 4.6 G/DL (ref 3.5–5.2)
ALBUMIN/GLOB SERPL: 1 G/DL
ALP SERPL-CCNC: 92 U/L (ref 39–117)
ALT SERPL W P-5'-P-CCNC: 84 U/L (ref 1–41)
ANION GAP SERPL CALCULATED.3IONS-SCNC: 12.1 MMOL/L (ref 5–15)
AST SERPL-CCNC: 40 U/L (ref 1–40)
BASOPHILS # BLD AUTO: 0.02 10*3/MM3 (ref 0–0.2)
BASOPHILS NFR BLD AUTO: 0.3 % (ref 0–1.5)
BILIRUB SERPL-MCNC: 0.2 MG/DL (ref 0.2–1.2)
BUN BLD-MCNC: 8 MG/DL (ref 6–20)
BUN/CREAT SERPL: 7.3 (ref 7–25)
CALCIUM SPEC-SCNC: 10.1 MG/DL (ref 8.6–10.5)
CHLORIDE SERPL-SCNC: 100 MMOL/L (ref 98–107)
CO2 SERPL-SCNC: 25.9 MMOL/L (ref 22–29)
CREAT BLD-MCNC: 1.1 MG/DL (ref 0.76–1.27)
CRP SERPL-MCNC: 0.56 MG/DL (ref 0–0.5)
D-LACTATE SERPL-SCNC: 1.4 MMOL/L (ref 0.5–2)
DEPRECATED RDW RBC AUTO: 44.6 FL (ref 37–54)
EOSINOPHIL # BLD AUTO: 0.17 10*3/MM3 (ref 0–0.4)
EOSINOPHIL NFR BLD AUTO: 2.3 % (ref 0.3–6.2)
ERYTHROCYTE [DISTWIDTH] IN BLOOD BY AUTOMATED COUNT: 14.7 % (ref 12.3–15.4)
GFR SERPL CREATININE-BSD FRML MDRD: 89 ML/MIN/1.73
GLOBULIN UR ELPH-MCNC: 4.7 GM/DL
GLUCOSE BLD-MCNC: 95 MG/DL (ref 65–99)
HCT VFR BLD AUTO: 45.6 % (ref 37.5–51)
HGB BLD-MCNC: 14.6 G/DL (ref 13–17.7)
IMM GRANULOCYTES # BLD AUTO: 0.02 10*3/MM3 (ref 0–0.05)
IMM GRANULOCYTES NFR BLD AUTO: 0.3 % (ref 0–0.5)
LYMPHOCYTES # BLD AUTO: 2.6 10*3/MM3 (ref 0.7–3.1)
LYMPHOCYTES NFR BLD AUTO: 34.9 % (ref 19.6–45.3)
MCH RBC QN AUTO: 26.9 PG (ref 26.6–33)
MCHC RBC AUTO-ENTMCNC: 32 G/DL (ref 31.5–35.7)
MCV RBC AUTO: 84 FL (ref 79–97)
MONOCYTES # BLD AUTO: 0.75 10*3/MM3 (ref 0.1–0.9)
MONOCYTES NFR BLD AUTO: 10.1 % (ref 5–12)
NEUTROPHILS # BLD AUTO: 3.9 10*3/MM3 (ref 1.7–7)
NEUTROPHILS NFR BLD AUTO: 52.1 % (ref 42.7–76)
NRBC BLD AUTO-RTO: 0 /100 WBC (ref 0–0.2)
PLATELET # BLD AUTO: 233 10*3/MM3 (ref 140–450)
PMV BLD AUTO: 11.1 FL (ref 6–12)
POTASSIUM BLD-SCNC: 4 MMOL/L (ref 3.5–5.2)
PROT SERPL-MCNC: 9.3 G/DL (ref 6–8.5)
RBC # BLD AUTO: 5.43 10*6/MM3 (ref 4.14–5.8)
SODIUM BLD-SCNC: 138 MMOL/L (ref 136–145)
WBC NRBC COR # BLD: 7.46 10*3/MM3 (ref 3.4–10.8)

## 2020-06-27 PROCEDURE — 99284 EMERGENCY DEPT VISIT MOD MDM: CPT

## 2020-06-27 PROCEDURE — 86140 C-REACTIVE PROTEIN: CPT | Performed by: NURSE PRACTITIONER

## 2020-06-27 PROCEDURE — 25010000002 PIPERACILLIN SOD-TAZOBACTAM PER 1 G: Performed by: NURSE PRACTITIONER

## 2020-06-27 PROCEDURE — 36415 COLL VENOUS BLD VENIPUNCTURE: CPT

## 2020-06-27 PROCEDURE — 83605 ASSAY OF LACTIC ACID: CPT | Performed by: NURSE PRACTITIONER

## 2020-06-27 PROCEDURE — 25010000002 VANCOMYCIN: Performed by: NURSE PRACTITIONER

## 2020-06-27 PROCEDURE — 73701 CT LOWER EXTREMITY W/DYE: CPT

## 2020-06-27 PROCEDURE — 0 IOVERSOL 68 % SOLUTION: Performed by: NURSE PRACTITIONER

## 2020-06-27 PROCEDURE — 85025 COMPLETE CBC W/AUTO DIFF WBC: CPT | Performed by: NURSE PRACTITIONER

## 2020-06-27 PROCEDURE — 87040 BLOOD CULTURE FOR BACTERIA: CPT | Performed by: NURSE PRACTITIONER

## 2020-06-27 PROCEDURE — 25010000002 KETOROLAC TROMETHAMINE PER 15 MG: Performed by: NURSE PRACTITIONER

## 2020-06-27 PROCEDURE — 80053 COMPREHEN METABOLIC PANEL: CPT | Performed by: NURSE PRACTITIONER

## 2020-06-27 RX ORDER — KETOROLAC TROMETHAMINE 30 MG/ML
15 INJECTION, SOLUTION INTRAMUSCULAR; INTRAVENOUS EVERY 6 HOURS PRN
Status: DISCONTINUED | OUTPATIENT
Start: 2020-06-27 | End: 2020-06-30 | Stop reason: HOSPADM

## 2020-06-27 RX ORDER — SODIUM CHLORIDE 0.9 % (FLUSH) 0.9 %
10 SYRINGE (ML) INJECTION AS NEEDED
Status: DISCONTINUED | OUTPATIENT
Start: 2020-06-27 | End: 2020-06-30 | Stop reason: HOSPADM

## 2020-06-27 RX ADMIN — VANCOMYCIN HYDROCHLORIDE 2000 MG: 1 INJECTION, POWDER, LYOPHILIZED, FOR SOLUTION INTRAVENOUS at 23:26

## 2020-06-27 RX ADMIN — PIPERACILLIN SODIUM AND TAZOBACTAM SODIUM 3.38 G: 3; .375 INJECTION, POWDER, LYOPHILIZED, FOR SOLUTION INTRAVENOUS at 22:55

## 2020-06-27 RX ADMIN — IOVERSOL 90 ML: 678 INJECTION INTRA-ARTERIAL; INTRAVENOUS at 22:25

## 2020-06-27 RX ADMIN — KETOROLAC TROMETHAMINE 15 MG: 30 INJECTION, SOLUTION INTRAMUSCULAR; INTRAVENOUS at 22:45

## 2020-06-28 ENCOUNTER — APPOINTMENT (OUTPATIENT)
Dept: GENERAL RADIOLOGY | Facility: HOSPITAL | Age: 45
End: 2020-06-28

## 2020-06-28 LAB
ALBUMIN SERPL-MCNC: 3.48 G/DL (ref 3.5–5.2)
ALBUMIN/GLOB SERPL: 0.9 G/DL
ALP SERPL-CCNC: 74 U/L (ref 39–117)
ALT SERPL W P-5'-P-CCNC: 64 U/L (ref 1–41)
ANION GAP SERPL CALCULATED.3IONS-SCNC: 12 MMOL/L (ref 5–15)
AST SERPL-CCNC: 33 U/L (ref 1–40)
BACTERIA SPEC AEROBE CULT: NORMAL
BACTERIA SPEC AEROBE CULT: NORMAL
BASOPHILS # BLD AUTO: 0.01 10*3/MM3 (ref 0–0.2)
BASOPHILS NFR BLD AUTO: 0.1 % (ref 0–1.5)
BILIRUB SERPL-MCNC: 0.2 MG/DL (ref 0.2–1.2)
BUN BLD-MCNC: 9 MG/DL (ref 6–20)
BUN/CREAT SERPL: 7.6 (ref 7–25)
CALCIUM SPEC-SCNC: 9.1 MG/DL (ref 8.6–10.5)
CHLORIDE SERPL-SCNC: 102 MMOL/L (ref 98–107)
CO2 SERPL-SCNC: 23 MMOL/L (ref 22–29)
CREAT BLD-MCNC: 1.19 MG/DL (ref 0.76–1.27)
CRP SERPL-MCNC: 0.37 MG/DL (ref 0–0.5)
DEPRECATED RDW RBC AUTO: 45.1 FL (ref 37–54)
EOSINOPHIL # BLD AUTO: 0.2 10*3/MM3 (ref 0–0.4)
EOSINOPHIL NFR BLD AUTO: 2.8 % (ref 0.3–6.2)
ERYTHROCYTE [DISTWIDTH] IN BLOOD BY AUTOMATED COUNT: 14.7 % (ref 12.3–15.4)
GFR SERPL CREATININE-BSD FRML MDRD: 81 ML/MIN/1.73
GLOBULIN UR ELPH-MCNC: 3.9 GM/DL
GLUCOSE BLD-MCNC: 94 MG/DL (ref 65–99)
HCT VFR BLD AUTO: 40.1 % (ref 37.5–51)
HGB BLD-MCNC: 12.7 G/DL (ref 13–17.7)
IMM GRANULOCYTES # BLD AUTO: 0.02 10*3/MM3 (ref 0–0.05)
IMM GRANULOCYTES NFR BLD AUTO: 0.3 % (ref 0–0.5)
LYMPHOCYTES # BLD AUTO: 2.73 10*3/MM3 (ref 0.7–3.1)
LYMPHOCYTES NFR BLD AUTO: 37.8 % (ref 19.6–45.3)
MCH RBC QN AUTO: 26.6 PG (ref 26.6–33)
MCHC RBC AUTO-ENTMCNC: 31.7 G/DL (ref 31.5–35.7)
MCV RBC AUTO: 84.1 FL (ref 79–97)
MONOCYTES # BLD AUTO: 0.93 10*3/MM3 (ref 0.1–0.9)
MONOCYTES NFR BLD AUTO: 12.9 % (ref 5–12)
NEUTROPHILS # BLD AUTO: 3.33 10*3/MM3 (ref 1.7–7)
NEUTROPHILS NFR BLD AUTO: 46.1 % (ref 42.7–76)
NRBC BLD AUTO-RTO: 0 /100 WBC (ref 0–0.2)
PLATELET # BLD AUTO: 200 10*3/MM3 (ref 140–450)
PMV BLD AUTO: 11.4 FL (ref 6–12)
POTASSIUM BLD-SCNC: 3.9 MMOL/L (ref 3.5–5.2)
PROT SERPL-MCNC: 7.4 G/DL (ref 6–8.5)
RBC # BLD AUTO: 4.77 10*6/MM3 (ref 4.14–5.8)
SODIUM BLD-SCNC: 137 MMOL/L (ref 136–145)
WBC NRBC COR # BLD: 7.22 10*3/MM3 (ref 3.4–10.8)

## 2020-06-28 PROCEDURE — 25010000002 MORPHINE PER 10 MG: Performed by: HOSPITALIST

## 2020-06-28 PROCEDURE — 99222 1ST HOSP IP/OBS MODERATE 55: CPT | Performed by: HOSPITALIST

## 2020-06-28 PROCEDURE — 25010000002 MORPHINE PER 10 MG: Performed by: INTERNAL MEDICINE

## 2020-06-28 PROCEDURE — 25010000002 PIPERACILLIN SOD-TAZOBACTAM PER 1 G: Performed by: HOSPITALIST

## 2020-06-28 PROCEDURE — 80053 COMPREHEN METABOLIC PANEL: CPT | Performed by: HOSPITALIST

## 2020-06-28 PROCEDURE — 73610 X-RAY EXAM OF ANKLE: CPT | Performed by: RADIOLOGY

## 2020-06-28 PROCEDURE — 73630 X-RAY EXAM OF FOOT: CPT

## 2020-06-28 PROCEDURE — 25010000002 KETOROLAC TROMETHAMINE PER 15 MG: Performed by: HOSPITALIST

## 2020-06-28 PROCEDURE — 25010000002 VANCOMYCIN 1 G RECONSTITUTED SOLUTION: Performed by: HOSPITALIST

## 2020-06-28 PROCEDURE — 73610 X-RAY EXAM OF ANKLE: CPT

## 2020-06-28 PROCEDURE — 86140 C-REACTIVE PROTEIN: CPT | Performed by: HOSPITALIST

## 2020-06-28 PROCEDURE — 94799 UNLISTED PULMONARY SVC/PX: CPT

## 2020-06-28 PROCEDURE — 85025 COMPLETE CBC W/AUTO DIFF WBC: CPT | Performed by: HOSPITALIST

## 2020-06-28 PROCEDURE — 25010000002 HEPARIN (PORCINE) PER 1000 UNITS: Performed by: HOSPITALIST

## 2020-06-28 PROCEDURE — 73630 X-RAY EXAM OF FOOT: CPT | Performed by: RADIOLOGY

## 2020-06-28 RX ORDER — NALOXONE HCL 0.4 MG/ML
0.4 VIAL (ML) INJECTION
Status: DISCONTINUED | OUTPATIENT
Start: 2020-06-28 | End: 2020-06-28

## 2020-06-28 RX ORDER — GUANFACINE 1 MG/1
1 TABLET ORAL EVERY MORNING
COMMUNITY

## 2020-06-28 RX ORDER — SODIUM CHLORIDE 9 MG/ML
100 INJECTION, SOLUTION INTRAVENOUS CONTINUOUS
Status: DISCONTINUED | OUTPATIENT
Start: 2020-06-28 | End: 2020-06-28

## 2020-06-28 RX ORDER — PAROXETINE HYDROCHLORIDE 20 MG/1
20 TABLET, FILM COATED ORAL EVERY MORNING
Status: CANCELLED | OUTPATIENT
Start: 2020-06-28

## 2020-06-28 RX ORDER — OXYCODONE HYDROCHLORIDE AND ACETAMINOPHEN 5; 325 MG/1; MG/1
1 TABLET ORAL EVERY 6 HOURS PRN
Status: ON HOLD | COMMUNITY
End: 2020-06-30 | Stop reason: SDUPTHER

## 2020-06-28 RX ORDER — NALOXONE HCL 0.4 MG/ML
0.4 VIAL (ML) INJECTION
Status: DISCONTINUED | OUTPATIENT
Start: 2020-06-28 | End: 2020-06-30 | Stop reason: HOSPADM

## 2020-06-28 RX ORDER — PAROXETINE HYDROCHLORIDE 20 MG/1
20 TABLET, FILM COATED ORAL EVERY MORNING
Status: DISCONTINUED | OUTPATIENT
Start: 2020-06-28 | End: 2020-06-30 | Stop reason: HOSPADM

## 2020-06-28 RX ORDER — TRAZODONE HYDROCHLORIDE 50 MG/1
100 TABLET ORAL NIGHTLY
Status: CANCELLED | OUTPATIENT
Start: 2020-06-28

## 2020-06-28 RX ORDER — MORPHINE SULFATE 2 MG/ML
2 INJECTION, SOLUTION INTRAMUSCULAR; INTRAVENOUS
Status: DISCONTINUED | OUTPATIENT
Start: 2020-06-28 | End: 2020-06-30 | Stop reason: HOSPADM

## 2020-06-28 RX ORDER — GUANFACINE 1 MG/1
1 TABLET ORAL EVERY MORNING
Status: CANCELLED | OUTPATIENT
Start: 2020-06-28

## 2020-06-28 RX ORDER — SODIUM CHLORIDE 0.9 % (FLUSH) 0.9 %
10 SYRINGE (ML) INJECTION EVERY 12 HOURS SCHEDULED
Status: DISCONTINUED | OUTPATIENT
Start: 2020-06-28 | End: 2020-06-30 | Stop reason: HOSPADM

## 2020-06-28 RX ORDER — PAROXETINE HYDROCHLORIDE 20 MG/1
20 TABLET, FILM COATED ORAL EVERY MORNING
COMMUNITY

## 2020-06-28 RX ORDER — MORPHINE SULFATE 2 MG/ML
1 INJECTION, SOLUTION INTRAMUSCULAR; INTRAVENOUS EVERY 4 HOURS PRN
Status: DISCONTINUED | OUTPATIENT
Start: 2020-06-28 | End: 2020-06-28

## 2020-06-28 RX ORDER — HEPARIN SODIUM 5000 [USP'U]/ML
5000 INJECTION, SOLUTION INTRAVENOUS; SUBCUTANEOUS EVERY 12 HOURS SCHEDULED
Status: DISCONTINUED | OUTPATIENT
Start: 2020-06-28 | End: 2020-06-30 | Stop reason: HOSPADM

## 2020-06-28 RX ORDER — SODIUM CHLORIDE 0.9 % (FLUSH) 0.9 %
10 SYRINGE (ML) INJECTION AS NEEDED
Status: DISCONTINUED | OUTPATIENT
Start: 2020-06-28 | End: 2020-06-30 | Stop reason: HOSPADM

## 2020-06-28 RX ORDER — GUANFACINE 1 MG/1
1 TABLET ORAL EVERY MORNING
Status: DISCONTINUED | OUTPATIENT
Start: 2020-06-28 | End: 2020-06-28

## 2020-06-28 RX ORDER — TRAZODONE HYDROCHLORIDE 50 MG/1
100 TABLET ORAL NIGHTLY
Status: DISCONTINUED | OUTPATIENT
Start: 2020-06-28 | End: 2020-06-30 | Stop reason: HOSPADM

## 2020-06-28 RX ORDER — GUANFACINE 1 MG/1
0.5 TABLET ORAL EVERY MORNING
Status: DISCONTINUED | OUTPATIENT
Start: 2020-06-29 | End: 2020-06-28

## 2020-06-28 RX ADMIN — NICOTINE 1 PATCH: 7 PATCH, EXTENDED RELEASE TRANSDERMAL at 03:55

## 2020-06-28 RX ADMIN — MORPHINE SULFATE 1 MG: 2 INJECTION, SOLUTION INTRAMUSCULAR; INTRAVENOUS at 01:34

## 2020-06-28 RX ADMIN — PIPERACILLIN SODIUM AND TAZOBACTAM SODIUM 3.38 G: 3; .375 INJECTION, POWDER, LYOPHILIZED, FOR SOLUTION INTRAVENOUS at 05:32

## 2020-06-28 RX ADMIN — VANCOMYCIN HYDROCHLORIDE 1000 MG: 1 INJECTION, POWDER, LYOPHILIZED, FOR SOLUTION INTRAVENOUS at 16:25

## 2020-06-28 RX ADMIN — MORPHINE SULFATE 1 MG: 2 INJECTION, SOLUTION INTRAMUSCULAR; INTRAVENOUS at 11:12

## 2020-06-28 RX ADMIN — MORPHINE SULFATE 1 MG: 2 INJECTION, SOLUTION INTRAMUSCULAR; INTRAVENOUS at 05:35

## 2020-06-28 RX ADMIN — HEPARIN SODIUM 5000 UNITS: 5000 INJECTION INTRAVENOUS; SUBCUTANEOUS at 21:44

## 2020-06-28 RX ADMIN — PIPERACILLIN SODIUM AND TAZOBACTAM SODIUM 3.38 G: 3; .375 INJECTION, POWDER, LYOPHILIZED, FOR SOLUTION INTRAVENOUS at 13:41

## 2020-06-28 RX ADMIN — VANCOMYCIN HYDROCHLORIDE 1000 MG: 1 INJECTION, POWDER, LYOPHILIZED, FOR SOLUTION INTRAVENOUS at 09:40

## 2020-06-28 RX ADMIN — SODIUM CHLORIDE 100 ML/HR: 9 INJECTION, SOLUTION INTRAVENOUS at 01:34

## 2020-06-28 RX ADMIN — TRAZODONE HYDROCHLORIDE 100 MG: 50 TABLET ORAL at 21:45

## 2020-06-28 RX ADMIN — SODIUM CHLORIDE, PRESERVATIVE FREE 10 ML: 5 INJECTION INTRAVENOUS at 21:45

## 2020-06-28 RX ADMIN — PAROXETINE HYDROCHLORIDE 20 MG: 20 TABLET, FILM COATED ORAL at 11:12

## 2020-06-28 RX ADMIN — KETOROLAC TROMETHAMINE 15 MG: 30 INJECTION, SOLUTION INTRAMUSCULAR; INTRAVENOUS at 21:46

## 2020-06-28 RX ADMIN — MORPHINE SULFATE 2 MG: 2 INJECTION, SOLUTION INTRAMUSCULAR; INTRAVENOUS at 14:51

## 2020-06-28 RX ADMIN — PIPERACILLIN SODIUM AND TAZOBACTAM SODIUM 3.38 G: 3; .375 INJECTION, POWDER, LYOPHILIZED, FOR SOLUTION INTRAVENOUS at 21:45

## 2020-06-29 ENCOUNTER — APPOINTMENT (OUTPATIENT)
Dept: ULTRASOUND IMAGING | Facility: HOSPITAL | Age: 45
End: 2020-06-29

## 2020-06-29 LAB
ALBUMIN SERPL-MCNC: 3.59 G/DL (ref 3.5–5.2)
ALBUMIN/GLOB SERPL: 1.1 G/DL
ALP SERPL-CCNC: 68 U/L (ref 39–117)
ALT SERPL W P-5'-P-CCNC: 53 U/L (ref 1–41)
ANION GAP SERPL CALCULATED.3IONS-SCNC: 8.4 MMOL/L (ref 5–15)
AST SERPL-CCNC: 24 U/L (ref 1–40)
BASOPHILS # BLD AUTO: 0.01 10*3/MM3 (ref 0–0.2)
BASOPHILS NFR BLD AUTO: 0.2 % (ref 0–1.5)
BILIRUB SERPL-MCNC: 0.2 MG/DL (ref 0.2–1.2)
BUN BLD-MCNC: 8 MG/DL (ref 6–20)
BUN/CREAT SERPL: 6.9 (ref 7–25)
CALCIUM SPEC-SCNC: 8.8 MG/DL (ref 8.6–10.5)
CHLORIDE SERPL-SCNC: 106 MMOL/L (ref 98–107)
CO2 SERPL-SCNC: 25.6 MMOL/L (ref 22–29)
CREAT BLD-MCNC: 1.16 MG/DL (ref 0.76–1.27)
CRP SERPL-MCNC: 0.26 MG/DL (ref 0–0.5)
DEPRECATED RDW RBC AUTO: 45.1 FL (ref 37–54)
EOSINOPHIL # BLD AUTO: 0.22 10*3/MM3 (ref 0–0.4)
EOSINOPHIL NFR BLD AUTO: 3.7 % (ref 0.3–6.2)
ERYTHROCYTE [DISTWIDTH] IN BLOOD BY AUTOMATED COUNT: 14.7 % (ref 12.3–15.4)
GFR SERPL CREATININE-BSD FRML MDRD: 84 ML/MIN/1.73
GLOBULIN UR ELPH-MCNC: 3.4 GM/DL
GLUCOSE BLD-MCNC: 106 MG/DL (ref 65–99)
HCT VFR BLD AUTO: 38.7 % (ref 37.5–51)
HGB BLD-MCNC: 12.2 G/DL (ref 13–17.7)
IMM GRANULOCYTES # BLD AUTO: 0.02 10*3/MM3 (ref 0–0.05)
IMM GRANULOCYTES NFR BLD AUTO: 0.3 % (ref 0–0.5)
LYMPHOCYTES # BLD AUTO: 2.08 10*3/MM3 (ref 0.7–3.1)
LYMPHOCYTES NFR BLD AUTO: 34.9 % (ref 19.6–45.3)
MCH RBC QN AUTO: 26.6 PG (ref 26.6–33)
MCHC RBC AUTO-ENTMCNC: 31.5 G/DL (ref 31.5–35.7)
MCV RBC AUTO: 84.3 FL (ref 79–97)
MONOCYTES # BLD AUTO: 0.59 10*3/MM3 (ref 0.1–0.9)
MONOCYTES NFR BLD AUTO: 9.9 % (ref 5–12)
NEUTROPHILS # BLD AUTO: 3.04 10*3/MM3 (ref 1.7–7)
NEUTROPHILS NFR BLD AUTO: 51 % (ref 42.7–76)
NRBC BLD AUTO-RTO: 0 /100 WBC (ref 0–0.2)
PLATELET # BLD AUTO: 195 10*3/MM3 (ref 140–450)
PMV BLD AUTO: 11.8 FL (ref 6–12)
POTASSIUM BLD-SCNC: 4 MMOL/L (ref 3.5–5.2)
PROT SERPL-MCNC: 7 G/DL (ref 6–8.5)
RBC # BLD AUTO: 4.59 10*6/MM3 (ref 4.14–5.8)
SODIUM BLD-SCNC: 140 MMOL/L (ref 136–145)
VANCOMYCIN TROUGH SERPL-MCNC: 10.7 MCG/ML (ref 5–20)
WBC NRBC COR # BLD: 5.96 10*3/MM3 (ref 3.4–10.8)

## 2020-06-29 PROCEDURE — 25010000002 HEPARIN (PORCINE) PER 1000 UNITS: Performed by: HOSPITALIST

## 2020-06-29 PROCEDURE — 80053 COMPREHEN METABOLIC PANEL: CPT | Performed by: INTERNAL MEDICINE

## 2020-06-29 PROCEDURE — 93970 EXTREMITY STUDY: CPT

## 2020-06-29 PROCEDURE — 86140 C-REACTIVE PROTEIN: CPT | Performed by: INTERNAL MEDICINE

## 2020-06-29 PROCEDURE — 25010000002 MORPHINE PER 10 MG: Performed by: INTERNAL MEDICINE

## 2020-06-29 PROCEDURE — 25010000002 PIPERACILLIN SOD-TAZOBACTAM PER 1 G: Performed by: HOSPITALIST

## 2020-06-29 PROCEDURE — 99232 SBSQ HOSP IP/OBS MODERATE 35: CPT | Performed by: INTERNAL MEDICINE

## 2020-06-29 PROCEDURE — 85025 COMPLETE CBC W/AUTO DIFF WBC: CPT | Performed by: INTERNAL MEDICINE

## 2020-06-29 PROCEDURE — 80202 ASSAY OF VANCOMYCIN: CPT

## 2020-06-29 PROCEDURE — 93970 EXTREMITY STUDY: CPT | Performed by: RADIOLOGY

## 2020-06-29 PROCEDURE — 25010000002 CEFEPIME PER 500 MG: Performed by: PHYSICIAN ASSISTANT

## 2020-06-29 RX ORDER — L.ACID,PARA/B.BIFIDUM/S.THERM 8B CELL
1 CAPSULE ORAL DAILY
Status: DISCONTINUED | OUTPATIENT
Start: 2020-06-29 | End: 2020-06-30 | Stop reason: HOSPADM

## 2020-06-29 RX ADMIN — VANCOMYCIN HYDROCHLORIDE 1000 MG: 1 INJECTION, POWDER, LYOPHILIZED, FOR SOLUTION INTRAVENOUS at 17:09

## 2020-06-29 RX ADMIN — CEFEPIME 2 G: 2 INJECTION, POWDER, FOR SOLUTION INTRAVENOUS at 17:54

## 2020-06-29 RX ADMIN — MORPHINE SULFATE 2 MG: 2 INJECTION, SOLUTION INTRAMUSCULAR; INTRAVENOUS at 12:19

## 2020-06-29 RX ADMIN — VANCOMYCIN HYDROCHLORIDE 1000 MG: 1 INJECTION, POWDER, LYOPHILIZED, FOR SOLUTION INTRAVENOUS at 23:36

## 2020-06-29 RX ADMIN — MORPHINE SULFATE 2 MG: 2 INJECTION, SOLUTION INTRAMUSCULAR; INTRAVENOUS at 17:54

## 2020-06-29 RX ADMIN — PAROXETINE HYDROCHLORIDE 20 MG: 20 TABLET, FILM COATED ORAL at 06:33

## 2020-06-29 RX ADMIN — HEPARIN SODIUM 5000 UNITS: 5000 INJECTION INTRAVENOUS; SUBCUTANEOUS at 21:53

## 2020-06-29 RX ADMIN — CEFEPIME 2 G: 2 INJECTION, POWDER, FOR SOLUTION INTRAVENOUS at 11:51

## 2020-06-29 RX ADMIN — Medication 1 CAPSULE: at 17:09

## 2020-06-29 RX ADMIN — VANCOMYCIN HYDROCHLORIDE 1000 MG: 1 INJECTION, POWDER, LYOPHILIZED, FOR SOLUTION INTRAVENOUS at 00:17

## 2020-06-29 RX ADMIN — PIPERACILLIN SODIUM AND TAZOBACTAM SODIUM 3.38 G: 3; .375 INJECTION, POWDER, LYOPHILIZED, FOR SOLUTION INTRAVENOUS at 04:21

## 2020-06-29 RX ADMIN — NICOTINE 1 PATCH: 7 PATCH, EXTENDED RELEASE TRANSDERMAL at 09:46

## 2020-06-29 RX ADMIN — VANCOMYCIN HYDROCHLORIDE 1000 MG: 1 INJECTION, POWDER, LYOPHILIZED, FOR SOLUTION INTRAVENOUS at 09:46

## 2020-06-29 RX ADMIN — TRAZODONE HYDROCHLORIDE 100 MG: 50 TABLET ORAL at 21:53

## 2020-06-29 RX ADMIN — SODIUM CHLORIDE, PRESERVATIVE FREE 10 ML: 5 INJECTION INTRAVENOUS at 21:53

## 2020-06-29 RX ADMIN — MORPHINE SULFATE 2 MG: 2 INJECTION, SOLUTION INTRAMUSCULAR; INTRAVENOUS at 08:17

## 2020-06-30 VITALS
DIASTOLIC BLOOD PRESSURE: 68 MMHG | HEIGHT: 71 IN | HEART RATE: 63 BPM | WEIGHT: 207.2 LBS | RESPIRATION RATE: 20 BRPM | BODY MASS INDEX: 29.01 KG/M2 | OXYGEN SATURATION: 98 % | TEMPERATURE: 98 F | SYSTOLIC BLOOD PRESSURE: 104 MMHG

## 2020-06-30 PROBLEM — L03.119 CELLULITIS OF FOOT: Status: RESOLVED | Noted: 2020-06-27 | Resolved: 2020-06-30

## 2020-06-30 LAB
ALBUMIN SERPL-MCNC: 3.77 G/DL (ref 3.5–5.2)
ALBUMIN/GLOB SERPL: 1 G/DL
ALP SERPL-CCNC: 72 U/L (ref 39–117)
ALT SERPL W P-5'-P-CCNC: 49 U/L (ref 1–41)
ANION GAP SERPL CALCULATED.3IONS-SCNC: 11.7 MMOL/L (ref 5–15)
AST SERPL-CCNC: 24 U/L (ref 1–40)
BASOPHILS # BLD AUTO: 0.02 10*3/MM3 (ref 0–0.2)
BASOPHILS NFR BLD AUTO: 0.4 % (ref 0–1.5)
BILIRUB SERPL-MCNC: 0.2 MG/DL (ref 0.2–1.2)
BUN SERPL-MCNC: 5 MG/DL (ref 6–20)
BUN/CREAT SERPL: 4.7 (ref 7–25)
CALCIUM SPEC-SCNC: 9.1 MG/DL (ref 8.6–10.5)
CHLORIDE SERPL-SCNC: 106 MMOL/L (ref 98–107)
CO2 SERPL-SCNC: 23.3 MMOL/L (ref 22–29)
CREAT SERPL-MCNC: 1.06 MG/DL (ref 0.76–1.27)
CRP SERPL-MCNC: 0.17 MG/DL (ref 0–0.5)
DEPRECATED RDW RBC AUTO: 45.2 FL (ref 37–54)
EOSINOPHIL # BLD AUTO: 0.2 10*3/MM3 (ref 0–0.4)
EOSINOPHIL NFR BLD AUTO: 3.6 % (ref 0.3–6.2)
ERYTHROCYTE [DISTWIDTH] IN BLOOD BY AUTOMATED COUNT: 14.6 % (ref 12.3–15.4)
GFR SERPL CREATININE-BSD FRML MDRD: 93 ML/MIN/1.73
GLOBULIN UR ELPH-MCNC: 3.9 GM/DL
GLUCOSE SERPL-MCNC: 92 MG/DL (ref 65–99)
HCT VFR BLD AUTO: 40 % (ref 37.5–51)
HGB BLD-MCNC: 12.5 G/DL (ref 13–17.7)
IMM GRANULOCYTES # BLD AUTO: 0.02 10*3/MM3 (ref 0–0.05)
IMM GRANULOCYTES NFR BLD AUTO: 0.4 % (ref 0–0.5)
LYMPHOCYTES # BLD AUTO: 2.22 10*3/MM3 (ref 0.7–3.1)
LYMPHOCYTES NFR BLD AUTO: 40.2 % (ref 19.6–45.3)
MAGNESIUM SERPL-MCNC: 2.3 MG/DL (ref 1.6–2.6)
MCH RBC QN AUTO: 26.3 PG (ref 26.6–33)
MCHC RBC AUTO-ENTMCNC: 31.3 G/DL (ref 31.5–35.7)
MCV RBC AUTO: 84.2 FL (ref 79–97)
MONOCYTES # BLD AUTO: 0.67 10*3/MM3 (ref 0.1–0.9)
MONOCYTES NFR BLD AUTO: 12.1 % (ref 5–12)
NEUTROPHILS NFR BLD AUTO: 2.39 10*3/MM3 (ref 1.7–7)
NEUTROPHILS NFR BLD AUTO: 43.3 % (ref 42.7–76)
NRBC BLD AUTO-RTO: 0 /100 WBC (ref 0–0.2)
PHOSPHATE SERPL-MCNC: 4.2 MG/DL (ref 2.5–4.5)
PLATELET # BLD AUTO: 182 10*3/MM3 (ref 140–450)
PMV BLD AUTO: 11.4 FL (ref 6–12)
POTASSIUM SERPL-SCNC: 4.2 MMOL/L (ref 3.5–5.2)
PROT SERPL-MCNC: 7.7 G/DL (ref 6–8.5)
RBC # BLD AUTO: 4.75 10*6/MM3 (ref 4.14–5.8)
SODIUM SERPL-SCNC: 141 MMOL/L (ref 136–145)
TSH SERPL DL<=0.05 MIU/L-ACNC: 3.43 UIU/ML (ref 0.27–4.2)
WBC # BLD AUTO: 5.52 10*3/MM3 (ref 3.4–10.8)

## 2020-06-30 PROCEDURE — 25010000002 HEPARIN (PORCINE) PER 1000 UNITS: Performed by: HOSPITALIST

## 2020-06-30 PROCEDURE — 25010000002 VANCOMYCIN 1 G RECONSTITUTED SOLUTION: Performed by: HOSPITALIST

## 2020-06-30 PROCEDURE — 25010000002 MORPHINE PER 10 MG: Performed by: INTERNAL MEDICINE

## 2020-06-30 PROCEDURE — 84100 ASSAY OF PHOSPHORUS: CPT | Performed by: INTERNAL MEDICINE

## 2020-06-30 PROCEDURE — 86140 C-REACTIVE PROTEIN: CPT | Performed by: INTERNAL MEDICINE

## 2020-06-30 PROCEDURE — 80053 COMPREHEN METABOLIC PANEL: CPT | Performed by: INTERNAL MEDICINE

## 2020-06-30 PROCEDURE — 84443 ASSAY THYROID STIM HORMONE: CPT | Performed by: INTERNAL MEDICINE

## 2020-06-30 PROCEDURE — 25010000002 CEFEPIME PER 500 MG: Performed by: PHYSICIAN ASSISTANT

## 2020-06-30 PROCEDURE — 85025 COMPLETE CBC W/AUTO DIFF WBC: CPT | Performed by: INTERNAL MEDICINE

## 2020-06-30 PROCEDURE — 99239 HOSP IP/OBS DSCHRG MGMT >30: CPT | Performed by: INTERNAL MEDICINE

## 2020-06-30 PROCEDURE — 83735 ASSAY OF MAGNESIUM: CPT | Performed by: INTERNAL MEDICINE

## 2020-06-30 RX ORDER — OXYCODONE HYDROCHLORIDE AND ACETAMINOPHEN 5; 325 MG/1; MG/1
1 TABLET ORAL EVERY 6 HOURS PRN
Qty: 12 TABLET | Refills: 0 | Status: SHIPPED | OUTPATIENT
Start: 2020-06-30 | End: 2020-07-03

## 2020-06-30 RX ADMIN — MORPHINE SULFATE 2 MG: 2 INJECTION, SOLUTION INTRAMUSCULAR; INTRAVENOUS at 01:25

## 2020-06-30 RX ADMIN — HEPARIN SODIUM 5000 UNITS: 5000 INJECTION INTRAVENOUS; SUBCUTANEOUS at 08:39

## 2020-06-30 RX ADMIN — PAROXETINE HYDROCHLORIDE 20 MG: 20 TABLET, FILM COATED ORAL at 06:30

## 2020-06-30 RX ADMIN — NICOTINE 1 PATCH: 7 PATCH, EXTENDED RELEASE TRANSDERMAL at 08:40

## 2020-06-30 RX ADMIN — MORPHINE SULFATE 2 MG: 2 INJECTION, SOLUTION INTRAMUSCULAR; INTRAVENOUS at 08:46

## 2020-06-30 RX ADMIN — CEFEPIME 2 G: 2 INJECTION, POWDER, FOR SOLUTION INTRAVENOUS at 02:16

## 2020-06-30 RX ADMIN — VANCOMYCIN HYDROCHLORIDE 1000 MG: 1 INJECTION, POWDER, LYOPHILIZED, FOR SOLUTION INTRAVENOUS at 08:39

## 2020-06-30 RX ADMIN — MORPHINE SULFATE 2 MG: 2 INJECTION, SOLUTION INTRAMUSCULAR; INTRAVENOUS at 12:00

## 2020-06-30 RX ADMIN — Medication 1 CAPSULE: at 08:39

## 2020-06-30 RX ADMIN — SODIUM CHLORIDE, PRESERVATIVE FREE 10 ML: 5 INJECTION INTRAVENOUS at 08:40

## 2020-07-01 ENCOUNTER — READMISSION MANAGEMENT (OUTPATIENT)
Dept: CALL CENTER | Facility: HOSPITAL | Age: 45
End: 2020-07-01

## 2020-07-01 NOTE — OUTREACH NOTE
Prep Survey      Responses   [facility] patient discharged from?  [facility]   Is LACE score < 7 ?  No   Eligibility  Readm Mgmt   Discharge diagnosis  Post-operative cellulitis of the right foot, early osteomyelitis not excluded   Does the patient have one of the following disease processes/diagnoses(primary or secondary)?  Other   Does the patient have Home health ordered?  No   Is there a DME ordered?  No   Prep survey completed?  Yes          Iliana Phelps RN

## 2020-07-02 LAB
BACTERIA SPEC AEROBE CULT: NORMAL
BACTERIA SPEC AEROBE CULT: NORMAL

## 2020-07-03 ENCOUNTER — READMISSION MANAGEMENT (OUTPATIENT)
Dept: CALL CENTER | Facility: HOSPITAL | Age: 45
End: 2020-07-03

## 2020-07-03 NOTE — OUTREACH NOTE
Medical Week 1 Survey      Responses   [facility] patient discharged from?  [facility]   COVID-19 Test Status  Negative   Does the patient have one of the following disease processes/diagnoses(primary or secondary)?  Other   Is there a successful TCM telephone encounter documented?  No   Week 1 attempt successful?  No   Unsuccessful attempts  Attempt 1          Geovanna Plummer RN

## 2020-07-06 ENCOUNTER — READMISSION MANAGEMENT (OUTPATIENT)
Dept: CALL CENTER | Facility: HOSPITAL | Age: 45
End: 2020-07-06

## 2020-07-06 NOTE — OUTREACH NOTE
Medical Week 1 Survey      Responses   [facility] patient discharged from?  [facility]   COVID-19 Test Status  Negative   Does the patient have one of the following disease processes/diagnoses(primary or secondary)?  Other   Is there a successful TCM telephone encounter documented?  No   Week 1 attempt successful?  Yes   Call start time  0952   Call end time  0957   Discharge diagnosis  Post-operative cellulitis of the right foot, early osteomyelitis not excluded   Meds reviewed with patient/caregiver?  Yes   Is the patient having any side effects they believe may be caused by any medication additions or changes?  No   Does the patient have all medications ordered at discharge?  Yes   Is the patient taking all medications as directed (includes completed medication regime)?  Yes   Does the patient have a primary care provider?   Yes   Does the patient have an appointment with their PCP within 7 days of discharge?  Yes   Has the patient kept scheduled appointments due by today?  N/A   Has home health visited the patient within 72 hours of discharge?  N/A   Psychosocial issues?  No   Did the patient receive a copy of their discharge instructions?  Yes   What is the patient's perception of their health status since discharge?  Improving   Is the patient/caregiver able to teach back signs and symptoms related to disease process for when to call PCP?  Yes   Is the patient/caregiver able to teach back signs and symptoms related to disease process for when to call 911?  Yes   Is the patient/caregiver able to teach back the hierarchy of who to call/visit for symptoms/problems? PCP, Specialist, Home health nurse, Urgent Care, ED, 911  Yes   Additional teach back comments  pt states edema in foot comes and goes, symptoms of infection reviewed and stated understanding, plans to see MD on 7/8/20, pt is non-wt bearing on right leg and foot   Week 1 call completed?  Yes          Rae Bland RN

## 2020-07-13 ENCOUNTER — READMISSION MANAGEMENT (OUTPATIENT)
Dept: CALL CENTER | Facility: HOSPITAL | Age: 45
End: 2020-07-13

## 2020-07-13 NOTE — OUTREACH NOTE
Medical Week 2 Survey      Responses   [facility] patient discharged from?  [facility]   COVID-19 Test Status  Negative   Does the patient have one of the following disease processes/diagnoses(primary or secondary)?  Other   Week 2 attempt successful?  Yes   Call start time  1104   Call end time  1114   Is patient permission given to speak with other caregiver?  Yes   List who call center can speak with  Gayathri Redding reviewed with patient/caregiver?  Yes   Is the patient having any side effects they believe may be caused by any medication additions or changes?  No   Does the patient have all medications ordered at discharge?  N/A   Is the patient taking all medications as directed (includes completed medication regime)?  Yes   Medication comments  States is out of pain medication-referred to call surgeon for evaluation.   Comments regarding appointments  surgeon appt 07/29/20   Does the patient have a primary care provider?   Yes   Does the patient have an appointment with their PCP within 7 days of discharge?  No   What is preventing the patient from scheduling follow up appointments within 7 days of discharge?  Haven't had time   Nursing Interventions  Advised patient to make appointment, Educated patient on importance of making appointment   Has the patient kept scheduled appointments due by today?  N/A   Has home health visited the patient within 72 hours of discharge?  N/A   Pulse Ox monitoring  None   Psychosocial issues?  No   Did the patient receive a copy of their discharge instructions?  Yes   Nursing interventions  Reviewed instructions with patient   What is the patient's perception of their health status since discharge?  Improving   Is the patient/caregiver able to teach back signs and symptoms related to disease process for when to call PCP?  Yes   Is the patient/caregiver able to teach back signs and symptoms related to disease process for when to call 911?  Yes   Is the patient/caregiver able to  teach back the hierarchy of who to call/visit for symptoms/problems? PCP, Specialist, Home health nurse, Urgent Care, ED, 911  Yes   Additional teach back comments  Reviewed s/s of infection. Encouraged to check temp twice daily.   Week 2 Call Completed?  Yes   Wrap up additional comments  STates is still having some pain in right foot, but has improved. States edema continues but has improved also. Denies any fever, drainage, or redness at incision. States keeping foot elevated, and using crutches. States will call surgeon due to has run out of pain medication.           Mandy Green RN

## 2020-08-23 ENCOUNTER — APPOINTMENT (OUTPATIENT)
Dept: GENERAL RADIOLOGY | Facility: HOSPITAL | Age: 45
End: 2020-08-23

## 2020-08-23 ENCOUNTER — HOSPITAL ENCOUNTER (EMERGENCY)
Facility: HOSPITAL | Age: 45
Discharge: HOME | End: 2020-08-23
Attending: FAMILY MEDICINE | Admitting: FAMILY MEDICINE

## 2020-08-23 VITALS
DIASTOLIC BLOOD PRESSURE: 92 MMHG | WEIGHT: 207 LBS | HEIGHT: 71 IN | OXYGEN SATURATION: 98 % | HEART RATE: 72 BPM | BODY MASS INDEX: 28.98 KG/M2 | SYSTOLIC BLOOD PRESSURE: 124 MMHG | RESPIRATION RATE: 16 BRPM | TEMPERATURE: 98 F

## 2020-08-23 DIAGNOSIS — S93.401A SPRAIN OF RIGHT ANKLE, UNSPECIFIED LIGAMENT, INITIAL ENCOUNTER: Primary | ICD-10-CM

## 2020-08-23 PROCEDURE — 99283 EMERGENCY DEPT VISIT LOW MDM: CPT

## 2020-08-23 PROCEDURE — 99284 EMERGENCY DEPT VISIT MOD MDM: CPT

## 2020-08-23 PROCEDURE — 73630 X-RAY EXAM OF FOOT: CPT

## 2020-08-23 PROCEDURE — 73610 X-RAY EXAM OF ANKLE: CPT

## 2020-08-23 RX ORDER — HYDROCODONE BITARTRATE AND ACETAMINOPHEN 5; 325 MG/1; MG/1
1 TABLET ORAL EVERY 6 HOURS PRN
Qty: 8 TABLET | Refills: 0 | Status: SHIPPED | OUTPATIENT
Start: 2020-08-23

## 2020-08-23 RX ORDER — IBUPROFEN 800 MG/1
800 TABLET ORAL EVERY 8 HOURS PRN
Qty: 30 TABLET | Refills: 0 | Status: SHIPPED | OUTPATIENT
Start: 2020-08-23 | End: 2021-02-19

## 2020-08-23 NOTE — ED NOTES
Request # [id number] [facility] report per Rashawn Nguyen PA-C  08/23/20 1021

## 2020-08-23 NOTE — ED PROVIDER NOTES
Subjective   44-year-old male who presents to the ED today due to a right foot and ankle injury.  He states he was going down some stairs last night around 10 PM when his ankle twisted.  He describes as an inversion injury.  He states his foot and ankle are swollen and painful.  He is unable to bear weight due to pain.  He states he took some ibuprofen this morning with no relief.  The patient has had previous surgery on this foot and ankle.  His last surgery was in June of this year.  He had a subtalar joint arthrodesis as well as hardware removed from his right great toe.  He is currently being followed by Dr. Mortensen with podiatry.      History provided by:  Patient  Leg Pain   Location:  Ankle and foot  Time since incident:  12 hours  Injury: yes    Mechanism of injury comment:  Twisted going down the steps  Ankle location:  R ankle  Foot location:  R foot  Pain details:     Quality:  Throbbing    Radiates to:  Does not radiate    Severity:  Moderate    Onset quality:  Sudden    Timing:  Constant    Progression:  Unchanged  Chronicity:  New  Dislocation: no    Relieved by:  Nothing  Worsened by:  Bearing weight  Ineffective treatments:  NSAIDs  Associated symptoms: decreased ROM and swelling    Associated symptoms: no numbness and no tingling        Review of Systems   Constitutional: Negative.    HENT: Negative.    Respiratory: Negative.    Cardiovascular: Negative.    Gastrointestinal: Negative.    Genitourinary: Negative.    Musculoskeletal: Positive for arthralgias, gait problem and joint swelling.   Skin: Negative.    Psychiatric/Behavioral: Negative.    All other systems reviewed and are negative.      Past Medical History:   Diagnosis Date   • Back pain    • Bunion    • Depression    • Knee pain, left    • Lumbar disc herniation     X 2   • Lumbar spondylosis 11/5/2019       No Known Allergies    Past Surgical History:   Procedure Laterality Date   • BUNIONECTOMY Right 6/18/2019    Procedure: BUNIONECTOMY;   Surgeon: Sybil Mortensen DPM;  Location:  [facility];  Service: Podiatry   • BUNIONECTOMY Left 8/20/2019    Procedure: BUNIONECTOMY;  Surgeon: Sybil Mortensen DPM;  [facility];  Service: Podiatry   • GASTROCNEMIUS RECESSION Left 8/20/2019    Procedure: RECESSION GASTROCNEMIUS;  Surgeon: Sybil Mortensen DPM;  [facility];  Service: Podiatry   • HARDWARE REMOVAL Right 6/2/2020    Procedure: HARDWARE REMOVAL;  Surgeon: Sybil Mortensen DPM;  [facility];  Service: Podiatry;  Laterality: Right;   • MEDIAL BRANCH BLOCK Right 11/13/2019    Procedure: LUMBAR MEDIAL BRANCH BLOCK RIGHT 3 LEVEL L3/4, L4/5, L5/S1;  Surgeon: Augustine Houston DO;  [facility];  Service: Pain Management   • MEDIAL BRANCH BLOCK Right 12/18/2019    Procedure: RIGHT LUMBAR MEDIAL BRANCH BLOCK 3 LEVEL L3/4, L4/5, L5/S1;  Surgeon: Augustine Houston DO;  [facility];  Service: Pain Management   • PLANTAR FASCIA RELEASE Right 6/2/2020    Procedure: FOOT PLANTAR FASCIECTOMY;  Surgeon: Sybil Mortensen DPM;  [facility];  Service: Podiatry;  Laterality: Right;   • RADIOFREQUENCY ABLATION Right 2/12/2020    Procedure: RADIOFREQUENCY ABLATION LUMBAR L3-S1 Right side only;  Surgeon: Augustine Houston DO;  [facility];  Service: Pain Management;  Laterality: Right;   • SUBTALAR ARTHRODESIS Right 6/18/2019    Procedure: SUBTALAR ARTHROEREISIS, GASTROC RECESSION;  Surgeon: Sybil Mortensen DPM;  [facility];  Service: Podiatry   • SUBTALAR ARTHRODESIS Left 8/20/2019    Procedure: SUBTALAR ARTHROESIS;  Surgeon: Sybil Mortensen DPM;  [facility];  Service: Podiatry   • SUBTALAR ARTHRODESIS Right 6/2/2020    Procedure: ANKLE SUBTALAR ARTHRODESIS;  Surgeon: Sybil Mortensen DPM;  [facility];  Service: Podiatry;  Laterality: Right;       Family History   Problem Relation Age of Onset   • Arthritis Mother    • No Known Problems Father        Social History      Socioeconomic History   • Marital status: Single     Spouse name: Not on file   • Number of children: Not on file   • Years of education: Not on file   • Highest education level: Not on file   Tobacco Use   • Smoking status: Current Every Day Smoker     Packs/day: 0.25     Years: 29.00     Pack years: 7.25     Types: Electronic Cigarette, Cigarettes   • Smokeless tobacco: Never Used   Substance and Sexual Activity   • Alcohol use: Yes     Frequency: Never     Comment: OCASSIONALLY   • Drug use: No   • Sexual activity: Defer           Objective   Physical Exam   Constitutional: He is oriented to person, place, and time. He appears well-developed and well-nourished. No distress.   HENT:   Head: Normocephalic and atraumatic.   Right Ear: External ear normal.   Left Ear: External ear normal.   Eyes: Pupils are equal, round, and reactive to light. Conjunctivae and EOM are normal.   Neck: Normal range of motion. Neck supple.   Cardiovascular: Normal rate, regular rhythm, normal heart sounds and intact distal pulses.   Pulmonary/Chest: Effort normal and breath sounds normal.   Abdominal: Soft. Bowel sounds are normal.   Musculoskeletal: He exhibits tenderness. He exhibits no deformity.   Swelling noted to the right lateral ankle as well as the medial foot near the base of the first metatarsal.  Tenderness to palpation.   Neurological: He is alert and oriented to person, place, and time.   Skin: Skin is warm and dry. Capillary refill takes less than 2 seconds.   Psychiatric: He has a normal mood and affect. His behavior is normal. Judgment and thought content normal.   Nursing note and vitals reviewed.      Splint - Cast - Strapping  Date/Time: 8/23/2020 10:30 AM  Performed by: Opal Rose PA  Authorized by: Sonya Shepard DO     Consent:     Consent obtained:  Verbal    Consent given by:  Patient  Pre-procedure details:     Sensation:  Normal  Procedure details:     Laterality:  Right    Location:  Ankle     Ankle:  R ankle    Splint type:  CAM walker boot    Supplies:  Prefabricated splint  Post-procedure details:     Pain:  Unchanged    Sensation:  Normal    Patient tolerance of procedure:  Tolerated well, no immediate complications  Comments:      Applied by ED tech - crutches provided               ED Course  ED Course as of Aug 23 1033   Sun Aug 23, 2020   1018 1.  Right ANKLE:  FINDINGS:  Three views -AP, oblique and lateral, of the  ankle are performed. Intact Hardware seen in the talus. Soft tissue swelling noted laterally. Ankle mortise intact. There is no acute fracture or dislocation. Bone mineralization is normal. No  radiopaque foreign body seen.     IMPRESSION:  No acute bony abnormality.  Soft tissue swelling seen laterally.        2.  Right FOOT     FINDINGS:  Three views-AP, oblique and lateral of the foot are performed. Osteotomy with hardware present in the first metatarsal with increased attenuation of the soft tissues likely postsurgical scarring. Hardware also seen in the talus and calcaneus,  intact. There is no acute fracture or dislocation. Bone mineralization is normal. No radiopaque foreign body seen.        IMPRESSION:  No acute bony abnormality or significant change from prior study..   XR Ankle 3+ View Right []   1023 [facility] queried #[id number] - pending manual process    []   1024 No acute findings on x-rays. Patient placed in a walking boot and provided with crutches.  He was advised on ice and elevation and to avoid weightbearing for the next 2-3 days.  He has an appointment with Dr. Mortensen in about a week.  He was advised to wear his boot until he follows up with her.  He will return to the ED if needed.    []      ED Course User Index  [] Opal Rose PA                                           [facility]  Number of Diagnoses or Management Options  Sprain of right ankle, unspecified ligament, initial encounter:      Amount and/or Complexity of Data Reviewed  Tests in the radiology  section of CPT®: reviewed    Patient Progress  Patient progress: stable      Final diagnoses:   Sprain of right ankle, unspecified ligament, initial encounter            Opal Rose PA  08/23/20 1039

## 2020-08-23 NOTE — ED NOTES
Discharge instructions reviewed with patient, patient instructed to return to ED if symptoms worsen or if any new problems arise. Patient verbalizes understanding of discharge instructions, patient ambulatory out of ED with walking boot and crutches. No acute distress noted.     Mary Anne Lee RN  08/23/20 2015

## 2020-10-20 ENCOUNTER — TRANSCRIBE ORDERS (OUTPATIENT)
Dept: ADMINISTRATIVE | Facility: HOSPITAL | Age: 45
End: 2020-10-20

## 2020-10-20 ENCOUNTER — HOSPITAL ENCOUNTER (OUTPATIENT)
Dept: CT IMAGING | Facility: HOSPITAL | Age: 45
Discharge: HOME | End: 2020-10-20
Admitting: PODIATRIST

## 2020-10-20 DIAGNOSIS — Z98.1 STATUS POST ARTHRODESIS: ICD-10-CM

## 2020-10-20 DIAGNOSIS — Z98.1 STATUS POST ARTHRODESIS: Primary | ICD-10-CM

## 2020-10-20 PROCEDURE — 73700 CT LOWER EXTREMITY W/O DYE: CPT

## 2020-10-20 PROCEDURE — 73700 CT LOWER EXTREMITY W/O DYE: CPT | Performed by: RADIOLOGY

## 2020-12-27 ENCOUNTER — APPOINTMENT (OUTPATIENT)
Dept: GENERAL RADIOLOGY | Facility: HOSPITAL | Age: 45
End: 2020-12-27

## 2020-12-27 ENCOUNTER — HOSPITAL ENCOUNTER (EMERGENCY)
Facility: HOSPITAL | Age: 45
Discharge: HOME | End: 2020-12-27
Attending: EMERGENCY MEDICINE | Admitting: EMERGENCY MEDICINE

## 2020-12-27 VITALS
RESPIRATION RATE: 17 BRPM | DIASTOLIC BLOOD PRESSURE: 72 MMHG | WEIGHT: 216 LBS | OXYGEN SATURATION: 97 % | HEART RATE: 78 BPM | BODY MASS INDEX: 30.24 KG/M2 | HEIGHT: 71 IN | SYSTOLIC BLOOD PRESSURE: 140 MMHG | TEMPERATURE: 98.9 F

## 2020-12-27 DIAGNOSIS — S89.91XA INJURY OF RIGHT KNEE, INITIAL ENCOUNTER: Primary | ICD-10-CM

## 2020-12-27 PROCEDURE — 73562 X-RAY EXAM OF KNEE 3: CPT | Performed by: RADIOLOGY

## 2020-12-27 PROCEDURE — 99283 EMERGENCY DEPT VISIT LOW MDM: CPT

## 2020-12-27 PROCEDURE — 73562 X-RAY EXAM OF KNEE 3: CPT

## 2020-12-27 RX ORDER — HYDROCODONE BITARTRATE AND ACETAMINOPHEN 7.5; 325 MG/1; MG/1
1 TABLET ORAL ONCE
Status: COMPLETED | OUTPATIENT
Start: 2020-12-27 | End: 2020-12-27

## 2020-12-27 RX ADMIN — HYDROCODONE BITARTRATE AND ACETAMINOPHEN 1 TABLET: 7.5; 325 TABLET ORAL at 13:30

## 2021-01-12 ENCOUNTER — HOSPITAL ENCOUNTER (OUTPATIENT)
Dept: HOSPITAL 79 - KOH-I | Age: 46
End: 2021-01-12
Attending: NURSE PRACTITIONER
Payer: COMMERCIAL
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
## 2021-01-12 DIAGNOSIS — S83.412A: ICD-10-CM

## 2021-01-12 DIAGNOSIS — S83.241A: Primary | ICD-10-CM

## 2021-02-09 ENCOUNTER — PREP FOR SURGERY (OUTPATIENT)
Dept: OTHER | Facility: HOSPITAL | Age: 46
End: 2021-02-09

## 2021-02-09 DIAGNOSIS — T84.84XA PAINFUL ORTHOPAEDIC HARDWARE (HCC): Primary | ICD-10-CM

## 2021-02-09 RX ORDER — CEFAZOLIN SODIUM 2 G/50ML
2 SOLUTION INTRAVENOUS ONCE
Status: CANCELLED | OUTPATIENT
Start: 2021-02-09 | End: 2021-02-09

## 2021-02-19 ENCOUNTER — TRANSCRIBE ORDERS (OUTPATIENT)
Dept: ADMINISTRATIVE | Facility: HOSPITAL | Age: 46
End: 2021-02-19

## 2021-02-19 ENCOUNTER — PRE-ADMISSION TESTING (OUTPATIENT)
Dept: PREADMISSION TESTING | Facility: HOSPITAL | Age: 46
End: 2021-02-19

## 2021-02-19 ENCOUNTER — LAB (OUTPATIENT)
Dept: LAB | Facility: HOSPITAL | Age: 46
End: 2021-02-19

## 2021-02-19 VITALS — HEIGHT: 71 IN | BODY MASS INDEX: 30.8 KG/M2 | WEIGHT: 220 LBS

## 2021-02-19 DIAGNOSIS — Z01.818 PRE-OPERATIVE CLEARANCE: ICD-10-CM

## 2021-02-19 DIAGNOSIS — Z01.818 PRE-OPERATIVE CLEARANCE: Primary | ICD-10-CM

## 2021-02-19 LAB
ANION GAP SERPL CALCULATED.3IONS-SCNC: 8.7 MMOL/L (ref 5–15)
BASOPHILS # BLD AUTO: 0.02 10*3/MM3 (ref 0–0.2)
BASOPHILS NFR BLD AUTO: 0.4 % (ref 0–1.5)
BUN SERPL-MCNC: 9 MG/DL (ref 6–20)
BUN/CREAT SERPL: 8.7 (ref 7–25)
CALCIUM SPEC-SCNC: 9.5 MG/DL (ref 8.6–10.5)
CHLORIDE SERPL-SCNC: 103 MMOL/L (ref 98–107)
CO2 SERPL-SCNC: 26.3 MMOL/L (ref 22–29)
CREAT SERPL-MCNC: 1.03 MG/DL (ref 0.76–1.27)
DEPRECATED RDW RBC AUTO: 45.6 FL (ref 37–54)
EOSINOPHIL # BLD AUTO: 0.15 10*3/MM3 (ref 0–0.4)
EOSINOPHIL NFR BLD AUTO: 2.8 % (ref 0.3–6.2)
ERYTHROCYTE [DISTWIDTH] IN BLOOD BY AUTOMATED COUNT: 15 % (ref 12.3–15.4)
GFR SERPL CREATININE-BSD FRML MDRD: 96 ML/MIN/1.73
GLUCOSE SERPL-MCNC: 92 MG/DL (ref 65–99)
HCT VFR BLD AUTO: 50 % (ref 37.5–51)
HGB BLD-MCNC: 15.7 G/DL (ref 13–17.7)
IMM GRANULOCYTES # BLD AUTO: 0.02 10*3/MM3 (ref 0–0.05)
IMM GRANULOCYTES NFR BLD AUTO: 0.4 % (ref 0–0.5)
LYMPHOCYTES # BLD AUTO: 2.27 10*3/MM3 (ref 0.7–3.1)
LYMPHOCYTES NFR BLD AUTO: 42.7 % (ref 19.6–45.3)
MCH RBC QN AUTO: 26.3 PG (ref 26.6–33)
MCHC RBC AUTO-ENTMCNC: 31.4 G/DL (ref 31.5–35.7)
MCV RBC AUTO: 83.9 FL (ref 79–97)
MONOCYTES # BLD AUTO: 0.57 10*3/MM3 (ref 0.1–0.9)
MONOCYTES NFR BLD AUTO: 10.7 % (ref 5–12)
NEUTROPHILS NFR BLD AUTO: 2.28 10*3/MM3 (ref 1.7–7)
NEUTROPHILS NFR BLD AUTO: 43 % (ref 42.7–76)
NRBC BLD AUTO-RTO: 0 /100 WBC (ref 0–0.2)
PLATELET # BLD AUTO: 214 10*3/MM3 (ref 140–450)
PMV BLD AUTO: 11.3 FL (ref 6–12)
POTASSIUM SERPL-SCNC: 4 MMOL/L (ref 3.5–5.2)
RBC # BLD AUTO: 5.96 10*6/MM3 (ref 4.14–5.8)
SODIUM SERPL-SCNC: 138 MMOL/L (ref 136–145)
WBC # BLD AUTO: 5.31 10*3/MM3 (ref 3.4–10.8)

## 2021-02-19 PROCEDURE — 36415 COLL VENOUS BLD VENIPUNCTURE: CPT

## 2021-02-19 PROCEDURE — C9803 HOPD COVID-19 SPEC COLLECT: HCPCS

## 2021-02-19 PROCEDURE — 80048 BASIC METABOLIC PNL TOTAL CA: CPT

## 2021-02-19 PROCEDURE — U0004 COV-19 TEST NON-CDC HGH THRU: HCPCS

## 2021-02-19 PROCEDURE — 85025 COMPLETE CBC W/AUTO DIFF WBC: CPT

## 2021-02-19 NOTE — DISCHARGE INSTRUCTIONS

## 2021-02-20 LAB — SARS-COV-2 RNA RESP QL NAA+PROBE: NOT DETECTED

## 2021-02-23 ENCOUNTER — ANESTHESIA EVENT (OUTPATIENT)
Dept: PERIOP | Facility: HOSPITAL | Age: 46
End: 2021-02-23

## 2021-02-23 ENCOUNTER — ANESTHESIA (OUTPATIENT)
Dept: PERIOP | Facility: HOSPITAL | Age: 46
End: 2021-02-23

## 2021-02-23 ENCOUNTER — HOSPITAL ENCOUNTER (OUTPATIENT)
Facility: HOSPITAL | Age: 46
Setting detail: HOSPITAL OUTPATIENT SURGERY
Discharge: HOME | End: 2021-02-23
Attending: PODIATRIST | Admitting: PODIATRIST

## 2021-02-23 ENCOUNTER — APPOINTMENT (OUTPATIENT)
Dept: GENERAL RADIOLOGY | Facility: HOSPITAL | Age: 46
End: 2021-02-23

## 2021-02-23 VITALS
BODY MASS INDEX: 29.12 KG/M2 | HEIGHT: 71 IN | SYSTOLIC BLOOD PRESSURE: 136 MMHG | RESPIRATION RATE: 18 BRPM | WEIGHT: 208 LBS | TEMPERATURE: 97.4 F | OXYGEN SATURATION: 100 % | HEART RATE: 64 BPM | DIASTOLIC BLOOD PRESSURE: 89 MMHG

## 2021-02-23 DIAGNOSIS — T84.84XA PAINFUL ORTHOPAEDIC HARDWARE (HCC): ICD-10-CM

## 2021-02-23 PROCEDURE — 25010000002 KETOROLAC TROMETHAMINE PER 15 MG: Performed by: NURSE ANESTHETIST, CERTIFIED REGISTERED

## 2021-02-23 PROCEDURE — 25010000002 FENTANYL CITRATE (PF) 100 MCG/2ML SOLUTION: Performed by: NURSE ANESTHETIST, CERTIFIED REGISTERED

## 2021-02-23 PROCEDURE — 25010000002 PROPOFOL 10 MG/ML EMULSION: Performed by: NURSE ANESTHETIST, CERTIFIED REGISTERED

## 2021-02-23 PROCEDURE — 25010000002 MIDAZOLAM PER 1 MG: Performed by: NURSE ANESTHETIST, CERTIFIED REGISTERED

## 2021-02-23 PROCEDURE — 76000 FLUOROSCOPY <1 HR PHYS/QHP: CPT

## 2021-02-23 PROCEDURE — 25010000003 CEFAZOLIN SODIUM-DEXTROSE 2-3 GM-%(50ML) RECONSTITUTED SOLUTION: Performed by: PODIATRIST

## 2021-02-23 PROCEDURE — 25010000002 ONDANSETRON PER 1 MG: Performed by: NURSE ANESTHETIST, CERTIFIED REGISTERED

## 2021-02-23 PROCEDURE — 76000 FLUOROSCOPY <1 HR PHYS/QHP: CPT | Performed by: RADIOLOGY

## 2021-02-23 RX ORDER — SODIUM CHLORIDE 0.9 % (FLUSH) 0.9 %
10 SYRINGE (ML) INJECTION AS NEEDED
Status: DISCONTINUED | OUTPATIENT
Start: 2021-02-23 | End: 2021-02-23 | Stop reason: HOSPADM

## 2021-02-23 RX ORDER — KETOROLAC TROMETHAMINE 30 MG/ML
INJECTION, SOLUTION INTRAMUSCULAR; INTRAVENOUS AS NEEDED
Status: DISCONTINUED | OUTPATIENT
Start: 2021-02-23 | End: 2021-02-23 | Stop reason: SURG

## 2021-02-23 RX ORDER — SODIUM CHLORIDE 0.9 % (FLUSH) 0.9 %
10 SYRINGE (ML) INJECTION EVERY 12 HOURS SCHEDULED
Status: DISCONTINUED | OUTPATIENT
Start: 2021-02-23 | End: 2021-02-23 | Stop reason: HOSPADM

## 2021-02-23 RX ORDER — MIDAZOLAM HYDROCHLORIDE 1 MG/ML
INJECTION INTRAMUSCULAR; INTRAVENOUS AS NEEDED
Status: DISCONTINUED | OUTPATIENT
Start: 2021-02-23 | End: 2021-02-23 | Stop reason: SURG

## 2021-02-23 RX ORDER — MIDAZOLAM HYDROCHLORIDE 1 MG/ML
1 INJECTION INTRAMUSCULAR; INTRAVENOUS
Status: DISCONTINUED | OUTPATIENT
Start: 2021-02-23 | End: 2021-02-23 | Stop reason: HOSPADM

## 2021-02-23 RX ORDER — LIDOCAINE HYDROCHLORIDE 20 MG/ML
INJECTION, SOLUTION INFILTRATION; PERINEURAL AS NEEDED
Status: DISCONTINUED | OUTPATIENT
Start: 2021-02-23 | End: 2021-02-23 | Stop reason: SURG

## 2021-02-23 RX ORDER — SODIUM CHLORIDE, SODIUM LACTATE, POTASSIUM CHLORIDE, CALCIUM CHLORIDE 600; 310; 30; 20 MG/100ML; MG/100ML; MG/100ML; MG/100ML
125 INJECTION, SOLUTION INTRAVENOUS ONCE
Status: COMPLETED | OUTPATIENT
Start: 2021-02-23 | End: 2021-02-23

## 2021-02-23 RX ORDER — ONDANSETRON 2 MG/ML
4 INJECTION INTRAMUSCULAR; INTRAVENOUS AS NEEDED
Status: DISCONTINUED | OUTPATIENT
Start: 2021-02-23 | End: 2021-02-23 | Stop reason: HOSPADM

## 2021-02-23 RX ORDER — FENTANYL CITRATE 50 UG/ML
50 INJECTION, SOLUTION INTRAMUSCULAR; INTRAVENOUS
Status: DISCONTINUED | OUTPATIENT
Start: 2021-02-23 | End: 2021-02-23 | Stop reason: HOSPADM

## 2021-02-23 RX ORDER — DROPERIDOL 2.5 MG/ML
0.62 INJECTION, SOLUTION INTRAMUSCULAR; INTRAVENOUS ONCE AS NEEDED
Status: DISCONTINUED | OUTPATIENT
Start: 2021-02-23 | End: 2021-02-23 | Stop reason: HOSPADM

## 2021-02-23 RX ORDER — PROPOFOL 10 MG/ML
VIAL (ML) INTRAVENOUS AS NEEDED
Status: DISCONTINUED | OUTPATIENT
Start: 2021-02-23 | End: 2021-02-23 | Stop reason: SURG

## 2021-02-23 RX ORDER — MIDAZOLAM HYDROCHLORIDE 1 MG/ML
2 INJECTION INTRAMUSCULAR; INTRAVENOUS
Status: DISCONTINUED | OUTPATIENT
Start: 2021-02-23 | End: 2021-02-23 | Stop reason: HOSPADM

## 2021-02-23 RX ORDER — IPRATROPIUM BROMIDE AND ALBUTEROL SULFATE 2.5; .5 MG/3ML; MG/3ML
3 SOLUTION RESPIRATORY (INHALATION) ONCE AS NEEDED
Status: DISCONTINUED | OUTPATIENT
Start: 2021-02-23 | End: 2021-02-23 | Stop reason: HOSPADM

## 2021-02-23 RX ORDER — FAMOTIDINE 10 MG/ML
INJECTION, SOLUTION INTRAVENOUS AS NEEDED
Status: DISCONTINUED | OUTPATIENT
Start: 2021-02-23 | End: 2021-02-23 | Stop reason: SURG

## 2021-02-23 RX ORDER — SODIUM CHLORIDE, SODIUM LACTATE, POTASSIUM CHLORIDE, CALCIUM CHLORIDE 600; 310; 30; 20 MG/100ML; MG/100ML; MG/100ML; MG/100ML
100 INJECTION, SOLUTION INTRAVENOUS ONCE AS NEEDED
Status: DISCONTINUED | OUTPATIENT
Start: 2021-02-23 | End: 2021-02-23 | Stop reason: HOSPADM

## 2021-02-23 RX ORDER — OXYCODONE HYDROCHLORIDE AND ACETAMINOPHEN 5; 325 MG/1; MG/1
1 TABLET ORAL ONCE AS NEEDED
Status: DISCONTINUED | OUTPATIENT
Start: 2021-02-23 | End: 2021-02-23 | Stop reason: HOSPADM

## 2021-02-23 RX ORDER — OXYCODONE HYDROCHLORIDE AND ACETAMINOPHEN 5; 325 MG/1; MG/1
TABLET ORAL
Qty: 30 TABLET | Refills: 0 | Status: SHIPPED | OUTPATIENT
Start: 2021-02-23

## 2021-02-23 RX ORDER — ONDANSETRON 2 MG/ML
INJECTION INTRAMUSCULAR; INTRAVENOUS AS NEEDED
Status: DISCONTINUED | OUTPATIENT
Start: 2021-02-23 | End: 2021-02-23 | Stop reason: SURG

## 2021-02-23 RX ORDER — BUPIVACAINE HYDROCHLORIDE 5 MG/ML
INJECTION, SOLUTION PERINEURAL AS NEEDED
Status: DISCONTINUED | OUTPATIENT
Start: 2021-02-23 | End: 2021-02-23 | Stop reason: HOSPADM

## 2021-02-23 RX ORDER — MEPERIDINE HYDROCHLORIDE 25 MG/ML
12.5 INJECTION INTRAMUSCULAR; INTRAVENOUS; SUBCUTANEOUS
Status: DISCONTINUED | OUTPATIENT
Start: 2021-02-23 | End: 2021-02-23 | Stop reason: HOSPADM

## 2021-02-23 RX ORDER — FENTANYL CITRATE 50 UG/ML
INJECTION, SOLUTION INTRAMUSCULAR; INTRAVENOUS AS NEEDED
Status: DISCONTINUED | OUTPATIENT
Start: 2021-02-23 | End: 2021-02-23 | Stop reason: SURG

## 2021-02-23 RX ORDER — CEFAZOLIN SODIUM 2 G/50ML
2 SOLUTION INTRAVENOUS ONCE
Status: COMPLETED | OUTPATIENT
Start: 2021-02-23 | End: 2021-02-23

## 2021-02-23 RX ADMIN — FENTANYL CITRATE 50 MCG: 50 INJECTION INTRAMUSCULAR; INTRAVENOUS at 11:05

## 2021-02-23 RX ADMIN — ONDANSETRON 4 MG: 2 INJECTION INTRAMUSCULAR; INTRAVENOUS at 10:30

## 2021-02-23 RX ADMIN — CEFAZOLIN SODIUM 2 G: 2 SOLUTION INTRAVENOUS at 10:30

## 2021-02-23 RX ADMIN — SODIUM CHLORIDE, POTASSIUM CHLORIDE, SODIUM LACTATE AND CALCIUM CHLORIDE: 600; 310; 30; 20 INJECTION, SOLUTION INTRAVENOUS at 10:30

## 2021-02-23 RX ADMIN — FENTANYL CITRATE 50 MCG: 50 INJECTION INTRAMUSCULAR; INTRAVENOUS at 10:37

## 2021-02-23 RX ADMIN — FAMOTIDINE 20 MG: 10 INJECTION INTRAVENOUS at 10:30

## 2021-02-23 RX ADMIN — LIDOCAINE HYDROCHLORIDE 40 MG: 20 INJECTION, SOLUTION INFILTRATION; PERINEURAL at 10:37

## 2021-02-23 RX ADMIN — KETOROLAC TROMETHAMINE 30 MG: 30 INJECTION, SOLUTION INTRAMUSCULAR at 10:42

## 2021-02-23 RX ADMIN — FENTANYL CITRATE 50 MCG: 50 INJECTION INTRAMUSCULAR; INTRAVENOUS at 10:30

## 2021-02-23 RX ADMIN — SODIUM CHLORIDE, POTASSIUM CHLORIDE, SODIUM LACTATE AND CALCIUM CHLORIDE 125 ML/HR: 600; 310; 30; 20 INJECTION, SOLUTION INTRAVENOUS at 08:48

## 2021-02-23 RX ADMIN — MIDAZOLAM 2 MG: 1 INJECTION INTRAMUSCULAR; INTRAVENOUS at 10:30

## 2021-02-23 RX ADMIN — PROPOFOL 200 MG: 10 INJECTION, EMULSION INTRAVENOUS at 10:37

## 2021-02-23 RX ADMIN — FENTANYL CITRATE 50 MCG: 50 INJECTION INTRAMUSCULAR; INTRAVENOUS at 10:50

## 2021-02-23 RX ADMIN — PROPOFOL 200 MG: 10 INJECTION, EMULSION INTRAVENOUS at 10:39

## 2021-02-23 NOTE — ANESTHESIA PROCEDURE NOTES
Airway  Urgency: elective    Date/Time: 2/23/2021 10:41 AM  Airway not difficult    General Information and Staff    Patient location during procedure: OR  CRNA: Karissa David CRNA    Indications and Patient Condition  Indications for airway management: airway protection    Preoxygenated: yes  MILS maintained throughout  Mask difficulty assessment: 0 - not attempted    Final Airway Details  Final airway type: supraglottic airway      Successful airway: unique  Size 5    Number of attempts at approach: 1  Assessment: lips, teeth, and gum same as pre-op

## 2021-02-23 NOTE — ANESTHESIA PREPROCEDURE EVALUATION
Anesthesia Evaluation     Patient summary reviewed and Nursing notes reviewed   no history of anesthetic complications:  NPO Solid Status: > 8 hours  NPO Liquid Status: > 8 hours           Airway   Mallampati: II  TM distance: >3 FB  Neck ROM: full  No difficulty expected  Dental - normal exam     Comment: Missing teeth    Pulmonary - normal exam   (+) a smoker Former,   (-) lung cancer  Cardiovascular - normal exam        Neuro/Psych  GI/Hepatic/Renal/Endo      Musculoskeletal     (+) back pain,   Abdominal  - normal exam   Substance History   (+) alcohol use,   (-) drug use     OB/GYN          Other   arthritis (Chronic Back Pain),               Lab Results   Component Value Date    WBC 5.31 02/19/2021    HGB 15.7 02/19/2021    HCT 50.0 02/19/2021    MCV 83.9 02/19/2021     02/19/2021       Lab Results   Component Value Date    GLUCOSE 92 02/19/2021    BUN 9 02/19/2021    CREATININE 1.03 02/19/2021    EGFRIFAFRI 96 02/19/2021    BCR 8.7 02/19/2021    K 4.0 02/19/2021    CO2 26.3 02/19/2021    CALCIUM 9.5 02/19/2021    ALBUMIN 3.77 06/30/2020    AST 24 06/30/2020    ALT 49 (H) 06/30/2020                Anesthesia Plan    ASA 2     general     intravenous induction     Anesthetic plan, all risks, benefits, and alternatives have been provided, discussed and informed consent has been obtained with: patient.

## 2021-02-23 NOTE — ANESTHESIA POSTPROCEDURE EVALUATION
Patient: Carlo Wellington    Procedure Summary     Date: 02/23/21 Room / Location:  [facility]  /  [facility]    Anesthesia Start: 1030 Anesthesia Stop: 1126    Procedure: HARDWARE REMOVAL (Right Ankle) Diagnosis:       Painful orthopaedic hardware (CMS/HCC)      (Painful orthopaedic hardware (CMS/HCC) [T84.84XA])    Surgeon: Sybil Mortensen DPM Provider: Doyle Valerio MD    Anesthesia Type: general ASA Status: 2          Anesthesia Type: general    Vitals  Vitals Value Taken Time   /62 02/23/21 1134   Temp 97.3 °F (36.3 °C) 02/23/21 1124   Pulse 72 02/23/21 1134   Resp 8 02/23/21 1134   SpO2 97 % 02/23/21 1134           Post Anesthesia Care and Evaluation    Patient location during evaluation: PHASE II  Patient participation: complete - patient participated  Level of consciousness: awake  Pain score: 1  Pain management: adequate  Airway patency: patent  Anesthetic complications: No anesthetic complications  PONV Status: none  Cardiovascular status: acceptable  Respiratory status: acceptable  Hydration status: acceptable

## 2021-04-11 ENCOUNTER — HOSPITAL ENCOUNTER (EMERGENCY)
Facility: HOSPITAL | Age: 46
Discharge: HOME | End: 2021-04-11
Attending: EMERGENCY MEDICINE | Admitting: EMERGENCY MEDICINE

## 2021-04-11 ENCOUNTER — APPOINTMENT (OUTPATIENT)
Dept: ULTRASOUND IMAGING | Facility: HOSPITAL | Age: 46
End: 2021-04-11

## 2021-04-11 VITALS
RESPIRATION RATE: 18 BRPM | WEIGHT: 217 LBS | HEIGHT: 71 IN | SYSTOLIC BLOOD PRESSURE: 115 MMHG | OXYGEN SATURATION: 99 % | TEMPERATURE: 98.2 F | HEART RATE: 62 BPM | DIASTOLIC BLOOD PRESSURE: 87 MMHG | BODY MASS INDEX: 30.38 KG/M2

## 2021-04-11 DIAGNOSIS — M76.60 ACHILLES TENDON PAIN: Primary | ICD-10-CM

## 2021-04-11 PROCEDURE — 99283 EMERGENCY DEPT VISIT LOW MDM: CPT

## 2021-04-11 PROCEDURE — 93971 EXTREMITY STUDY: CPT

## 2021-04-11 RX ORDER — HYDROCODONE BITARTRATE AND ACETAMINOPHEN 5; 325 MG/1; MG/1
1 TABLET ORAL ONCE
Status: COMPLETED | OUTPATIENT
Start: 2021-04-11 | End: 2021-04-11

## 2021-04-11 RX ADMIN — HYDROCODONE BITARTRATE AND ACETAMINOPHEN 1 TABLET: 5; 325 TABLET ORAL at 08:03

## 2021-04-11 NOTE — ED PROVIDER NOTES
Subjective   Patient presents with pain to right Achilles radiating to his right calf.  He has recently had multiple foot surgeries by Dr. Sybil Mortensen.  He notes that he has been in a walking boot on his right lower extremity until the past couple of weeks.  He notes that he has gone through physical therapy.  He denies any recent trauma, fever, numbness, tingling.      History provided by:  Patient      Review of Systems   Musculoskeletal:        Right calf and Achilles pain   All other systems reviewed and are negative.      Past Medical History:   Diagnosis Date   • Back pain    • Bunion    • Depression    • Knee pain, left    • Lumbar disc herniation     X 2   • Lumbar spondylosis 11/5/2019       No Known Allergies    Past Surgical History:   Procedure Laterality Date   • BUNIONECTOMY Right 6/18/2019    Procedure: BUNIONECTOMY;  Surgeon: Sybil Mortensen DPM;  Location: [facility] OR;  Service: Podiatry   • BUNIONECTOMY Left 8/20/2019    Procedure: BUNIONECTOMY;  Surgeon: Sybil Mortensen DPM;  Location: [facility] OR;  Service: Podiatry   • GASTROCNEMIUS RECESSION Left 8/20/2019    Procedure: RECESSION GASTROCNEMIUS;  Surgeon: Sybil Mortensen DPM;  Location: [facility] OR;  Service: Podiatry   • HARDWARE REMOVAL Right 6/2/2020    Procedure: HARDWARE REMOVAL;  Surgeon: Sybil Mortensen DPM;  Location: [facility] OR;  Service: Podiatry;  Laterality: Right;   • HARDWARE REMOVAL Right 2/23/2021    Procedure: HARDWARE REMOVAL;  Surgeon: Sybil Mortensen DPM;  Location: [facility] OR;  Service: Podiatry;  Laterality: Right;   • MEDIAL BRANCH BLOCK Right 11/13/2019    Procedure: LUMBAR MEDIAL BRANCH BLOCK RIGHT 3 LEVEL L3/4, L4/5, L5/S1;  Surgeon: Augustine Houston DO;  Location: [facility] OR;  Service: Pain Management   • MEDIAL BRANCH BLOCK Right 12/18/2019    Procedure: RIGHT LUMBAR MEDIAL BRANCH BLOCK 3 LEVEL L3/4, L4/5, L5/S1;  Surgeon: Augustine Houston DO;  Location: [facility] OR;  Service: Pain Management   • PLANTAR FASCIA  RELEASE Right 6/2/2020    Procedure: FOOT PLANTAR FASCIECTOMY;  Surgeon: Sybil Mortensen DPM;  Location: [facility] OR;  Service: Podiatry;  Laterality: Right;   • RADIOFREQUENCY ABLATION Right 2/12/2020    Procedure: RADIOFREQUENCY ABLATION LUMBAR L3-S1 Right side only;  Surgeon: Augustine Houston DO;  Location: [facility] OR;  Service: Pain Management;  Laterality: Right;   • SUBTALAR ARTHRODESIS Right 6/18/2019    Procedure: SUBTALAR ARTHROEREISIS, GASTROC RECESSION;  Surgeon: Sybil Mortensen DPM;  Location: [facility] OR;  Service: Podiatry   • SUBTALAR ARTHRODESIS Left 8/20/2019    Procedure: SUBTALAR ARTHROESIS;  Surgeon: Sybil Mortensen DPM;  Location: [facility] OR;  Service: Podiatry   • SUBTALAR ARTHRODESIS Right 6/2/2020    Procedure: ANKLE SUBTALAR ARTHRODESIS;  Surgeon: Sybil Mortensen DPM;  Location: [facility] OR;  Service: Podiatry;  Laterality: Right;       Family History   Problem Relation Age of Onset   • Arthritis Mother    • No Known Problems Father        Social History     Socioeconomic History   • Marital status: Single     Spouse name: Not on file   • Number of children: Not on file   • Years of education: Not on file   • Highest education level: Not on file   Tobacco Use   • Smoking status: Current Every Day Smoker     Packs/day: 0.25     Years: 29.00     Pack years: 7.25     Types: Electronic Cigarette, Cigarettes   • Smokeless tobacco: Never Used   Substance and Sexual Activity   • Alcohol use: Yes     Comment: OCASSIONALLY   • Drug use: No   • Sexual activity: Defer           Objective   Physical Exam  Vitals and nursing note reviewed.   Constitutional:       Appearance: Normal appearance.   HENT:      Head: Normocephalic and atraumatic.      Mouth/Throat:      Mouth: Mucous membranes are moist.      Pharynx: Oropharynx is clear.   Eyes:      Extraocular Movements: Extraocular movements intact.      Conjunctiva/sclera: Conjunctivae normal.      Pupils: Pupils are equal, round, and reactive to  light.   Cardiovascular:      Rate and Rhythm: Normal rate and regular rhythm.      Pulses: Normal pulses.   Pulmonary:      Effort: Pulmonary effort is normal. No respiratory distress.   Musculoskeletal:         General: Tenderness present. No swelling, deformity or signs of injury.      Comments: Right Achilles tenderness and distal right calf tenderness but no calf swelling   Skin:     General: Skin is warm and dry.      Capillary Refill: Capillary refill takes less than 2 seconds.   Neurological:      Mental Status: He is alert. Mental status is at baseline.      Gait: Gait abnormal.      Comments: Mild antalgic gait   Psychiatric:         Mood and Affect: Mood normal.         Behavior: Behavior normal.         Procedures           ED Course  ED Course as of Apr 11 0850   Sun Apr 11, 2021   0826 Doppler ultrasound is negative.  Patient feels better after Norco.  His discomfort is likely coming from his Achilles tendon as he was in a walking boot for a while after surgery.  He will call his primary care physician and podiatrist tomorrow for follow-up appointments.  He is well-appearing and comfortable at time of discharge and agreeable with plan of care.    [JG]      ED Course User Index  [JG] Armando Doherty DO                                           [facility]    Final diagnoses:   Achilles tendon pain       ED Disposition  ED Disposition     ED Disposition Condition Comment    Discharge Stable           Andrzej Cobos MD  [address] [place] [address]  [phone]    Schedule an appointment as soon as possible for a visit in 2 days           Medication List      No changes were made to your prescriptions during this visit.          Armando Doherty DO  04/11/21 0890

## 2021-10-21 ENCOUNTER — HOSPITAL ENCOUNTER (OUTPATIENT)
Dept: HOSPITAL 79 - KOH-I | Age: 46
End: 2021-10-21
Attending: PODIATRIST
Payer: COMMERCIAL

## 2021-10-21 DIAGNOSIS — M19.071: ICD-10-CM

## 2021-10-21 DIAGNOSIS — M79.671: Primary | ICD-10-CM

## 2021-10-21 DIAGNOSIS — M25.571: ICD-10-CM

## 2021-10-28 ENCOUNTER — HOSPITAL ENCOUNTER (OUTPATIENT)
Dept: HOSPITAL 79 - KOH-I | Age: 46
End: 2021-10-28
Attending: PODIATRIST
Payer: COMMERCIAL

## 2021-10-28 DIAGNOSIS — R93.6: ICD-10-CM

## 2021-10-28 DIAGNOSIS — M21.611: ICD-10-CM

## 2021-10-28 DIAGNOSIS — M21.071: Primary | ICD-10-CM

## 2021-12-18 ENCOUNTER — APPOINTMENT (OUTPATIENT)
Dept: GENERAL RADIOLOGY | Facility: HOSPITAL | Age: 46
End: 2021-12-18

## 2021-12-18 ENCOUNTER — HOSPITAL ENCOUNTER (EMERGENCY)
Facility: HOSPITAL | Age: 46
Discharge: HOME | End: 2021-12-18
Attending: STUDENT IN AN ORGANIZED HEALTH CARE EDUCATION/TRAINING PROGRAM | Admitting: STUDENT IN AN ORGANIZED HEALTH CARE EDUCATION/TRAINING PROGRAM

## 2021-12-18 VITALS
HEIGHT: 71 IN | OXYGEN SATURATION: 98 % | HEART RATE: 84 BPM | RESPIRATION RATE: 18 BRPM | BODY MASS INDEX: 31.5 KG/M2 | WEIGHT: 225 LBS | DIASTOLIC BLOOD PRESSURE: 89 MMHG | TEMPERATURE: 97.1 F | SYSTOLIC BLOOD PRESSURE: 131 MMHG

## 2021-12-18 DIAGNOSIS — S72.434A CLOSED NONDISPLACED FRACTURE OF MEDIAL CONDYLE OF RIGHT FEMUR, INITIAL ENCOUNTER (HCC): Primary | ICD-10-CM

## 2021-12-18 PROCEDURE — 99283 EMERGENCY DEPT VISIT LOW MDM: CPT

## 2021-12-18 PROCEDURE — 73562 X-RAY EXAM OF KNEE 3: CPT

## 2021-12-18 PROCEDURE — 73590 X-RAY EXAM OF LOWER LEG: CPT

## 2021-12-18 PROCEDURE — 73630 X-RAY EXAM OF FOOT: CPT

## 2021-12-18 PROCEDURE — 25010000002 KETOROLAC TROMETHAMINE PER 15 MG: Performed by: STUDENT IN AN ORGANIZED HEALTH CARE EDUCATION/TRAINING PROGRAM

## 2021-12-18 PROCEDURE — 73610 X-RAY EXAM OF ANKLE: CPT

## 2021-12-18 PROCEDURE — 96372 THER/PROPH/DIAG INJ SC/IM: CPT

## 2021-12-18 RX ORDER — KETOROLAC TROMETHAMINE 30 MG/ML
15 INJECTION, SOLUTION INTRAMUSCULAR; INTRAVENOUS ONCE
Status: DISCONTINUED | OUTPATIENT
Start: 2021-12-18 | End: 2021-12-18

## 2021-12-18 RX ORDER — KETOROLAC TROMETHAMINE 30 MG/ML
15 INJECTION, SOLUTION INTRAMUSCULAR; INTRAVENOUS ONCE
Status: DISCONTINUED | OUTPATIENT
Start: 2021-12-18 | End: 2021-12-18 | Stop reason: ALTCHOICE

## 2021-12-18 RX ORDER — KETOROLAC TROMETHAMINE 30 MG/ML
15 INJECTION, SOLUTION INTRAMUSCULAR; INTRAVENOUS ONCE
Status: COMPLETED | OUTPATIENT
Start: 2021-12-18 | End: 2021-12-18

## 2021-12-18 RX ADMIN — KETOROLAC TROMETHAMINE 15 MG: 60 INJECTION, SOLUTION INTRAMUSCULAR at 20:36
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
## 2021-12-22 ENCOUNTER — HOSPITAL ENCOUNTER (OUTPATIENT)
Dept: HOSPITAL 79 - US | Age: 46
End: 2021-12-22
Payer: COMMERCIAL

## 2021-12-22 DIAGNOSIS — R22.41: Primary | ICD-10-CM

## 2021-12-23 ENCOUNTER — HOSPITAL ENCOUNTER (OUTPATIENT)
Dept: HOSPITAL 79 - EMI | Age: 46
End: 2021-12-23
Attending: ORTHOPAEDIC SURGERY
Payer: COMMERCIAL

## 2021-12-23 DIAGNOSIS — M25.551: Primary | ICD-10-CM

## 2021-12-23 DIAGNOSIS — S83.411A: ICD-10-CM

## 2021-12-23 DIAGNOSIS — S93.491A: ICD-10-CM

## 2021-12-23 DIAGNOSIS — S92.151A: ICD-10-CM

## 2021-12-23 DIAGNOSIS — M19.071: ICD-10-CM

## 2022-01-04 ENCOUNTER — HOSPITAL ENCOUNTER (OUTPATIENT)
Dept: HOSPITAL 79 - OPSV2 | Age: 47
End: 2022-01-04
Attending: PODIATRIST
Payer: COMMERCIAL

## 2022-01-04 DIAGNOSIS — M96.0: ICD-10-CM

## 2022-01-04 DIAGNOSIS — Z01.818: Primary | ICD-10-CM

## 2022-01-04 LAB
BUN/CREATININE RATIO: 7 (ref 0–10)
HGB BLD-MCNC: 15.8 GM/DL (ref 14–17.5)
RED BLOOD COUNT: 5.73 M/UL (ref 4.2–5.5)
WHITE BLOOD COUNT: 6 K/UL (ref 4.5–11)

## 2022-01-14 ENCOUNTER — HOSPITAL ENCOUNTER (EMERGENCY)
Dept: HOSPITAL 79 - ER1 | Age: 47
Discharge: HOME | End: 2022-01-14
Payer: COMMERCIAL

## 2022-01-14 DIAGNOSIS — F17.210: ICD-10-CM

## 2022-01-14 DIAGNOSIS — M79.671: Primary | ICD-10-CM

## 2022-09-09 ENCOUNTER — HOSPITAL ENCOUNTER (OUTPATIENT)
Dept: HOSPITAL 79 - KOH-I | Age: 47
End: 2022-09-09
Payer: MEDICARE

## 2022-09-09 DIAGNOSIS — M25.471: ICD-10-CM

## 2022-09-09 DIAGNOSIS — M19.071: ICD-10-CM

## 2022-09-09 DIAGNOSIS — M25.571: Primary | ICD-10-CM

## 2022-09-09 DIAGNOSIS — M24.071: ICD-10-CM

## 2022-12-02 ENCOUNTER — TRANSCRIBE ORDERS (OUTPATIENT)
Dept: ADMINISTRATIVE | Facility: HOSPITAL | Age: 47
End: 2022-12-02

## 2022-12-02 DIAGNOSIS — M54.50 LOW BACK PAIN, UNSPECIFIED BACK PAIN LATERALITY, UNSPECIFIED CHRONICITY, UNSPECIFIED WHETHER SCIATICA PRESENT: Primary | ICD-10-CM

## 2022-12-22 ENCOUNTER — HOSPITAL ENCOUNTER (OUTPATIENT)
Dept: MRI IMAGING | Facility: HOSPITAL | Age: 47
Discharge: HOME | End: 2022-12-22
Admitting: FAMILY MEDICINE

## 2022-12-22 DIAGNOSIS — M54.50 LOW BACK PAIN, UNSPECIFIED BACK PAIN LATERALITY, UNSPECIFIED CHRONICITY, UNSPECIFIED WHETHER SCIATICA PRESENT: ICD-10-CM

## 2022-12-22 PROCEDURE — 72148 MRI LUMBAR SPINE W/O DYE: CPT

## 2022-12-22 PROCEDURE — 72148 MRI LUMBAR SPINE W/O DYE: CPT | Performed by: RADIOLOGY

## (undated) DEVICE — HEADLESS COMPRESSION SCREW
Type: IMPLANTABLE DEVICE | Site: FOOT | Status: NON-FUNCTIONAL
Brand: FIXOS
Removed: 2020-06-02

## (undated) DEVICE — SUT NLY 2/0 664G

## (undated) DEVICE — SUT MNCRYL 2/0 SH 27IN Y417H

## (undated) DEVICE — SUT ETHLN 3/0 PS2 18 IN 1669H

## (undated) DEVICE — HOLDER: Brand: DEROYAL

## (undated) DEVICE — STANDARD DRILL BIT , AO

## (undated) DEVICE — GLV SURG PREMIERPRO MIC LTX PF SZ7 BRN

## (undated) DEVICE — OSCILLATING SAW BLADE, 9MM X 24.6MM X 0.64MM: Brand: MICROAIRE®

## (undated) DEVICE — SYR LL 3CC

## (undated) DEVICE — GLV SURG SENSICARE W/ALOE PF LF 6.5 STRL

## (undated) DEVICE — BNDG ELAS CO-FLEX SLF ADHR 4IN5YD LF STRL

## (undated) DEVICE — SUT MNCRYL 2/0 SH 27IN UD MCP417H

## (undated) DEVICE — DRP C/ARM W/BAND W/CLIPS 41X74IN

## (undated) DEVICE — DISPOSABLE TOURNIQUET CUFF SINGLE BLADDER, SINGLE PORT AND LUER LOCK CONNECTOR: Brand: COLOR CUFF

## (undated) DEVICE — Device

## (undated) DEVICE — SUT ETHLN 3/0 FS1 663G

## (undated) DEVICE — BNDG ELAS ELITE V/CLOSE 4IN 5YD LF STRL

## (undated) DEVICE — ELECTRD BLD EDGE/INSUL1P SFTY SLV 2.75IN

## (undated) DEVICE — BNDG ELAS ELITE V/CLOSE 6IN 5YD LF STRL

## (undated) DEVICE — SPNG LAP PREWSH SFTPK 18X18IN STRL PK/5

## (undated) DEVICE — K-WIRE
Type: IMPLANTABLE DEVICE | Status: NON-FUNCTIONAL
Brand: ASNIS
Removed: 2019-06-18

## (undated) DEVICE — NDL BLNT 18G 1 1/2IN

## (undated) DEVICE — SUT MNCRYL PLS ANTIB UD 3/0 PS2 27IN

## (undated) DEVICE — PK EXTREM LOWR 70

## (undated) DEVICE — SUT MNCRYL 3/0 SH 27 IN Y416H

## (undated) DEVICE — SHEET,DRAPE,53X77,STERILE: Brand: MEDLINE

## (undated) DEVICE — GLV SURG TRIUMPH ORTHO W/ALOE PF LTX 7.5 STRL

## (undated) DEVICE — CANNULATED DRILL: Brand: ASNIS

## (undated) DEVICE — APPL CHG2PCT ALC70PCT 3ML ORNG

## (undated) DEVICE — K-WIRE
Type: IMPLANTABLE DEVICE | Site: FOOT | Status: NON-FUNCTIONAL
Brand: ASNIS
Removed: 2019-08-20

## (undated) DEVICE — UNDERCAST PADDING: Brand: DEROYAL

## (undated) DEVICE — NDL HYPO ECLPS SFTY 25G 1 1/2IN

## (undated) DEVICE — NDL SPINE 25G 31/2IN BLU

## (undated) DEVICE — CANNULATED COUNTERSINK: Brand: FIXOS

## (undated) DEVICE — 4-PORT MANIFOLD: Brand: NEPTUNE 2

## (undated) DEVICE — 3M™ STERI-STRIP™ REINFORCED ADHESIVE SKIN CLOSURES, R1547, 1/2 IN X 4 IN (12 MM X 100 MM), 6 STRIPS/ENVELOPE: Brand: 3M™ STERI-STRIP™

## (undated) DEVICE — SPNG GZ WOVN 4X4IN 12PLY 10/BX STRL

## (undated) DEVICE — SPLNT ORTHOGLASS 5INX15FT SN

## (undated) DEVICE — DRSNG ADAPTIC 3X8

## (undated) DEVICE — SUT MNCRYL 3/0 SH 27IN UD MCP416H

## (undated) DEVICE — DRSNG PAD ABD 8X10IN STRL

## (undated) DEVICE — SYR LL TP 10ML STRL

## (undated) DEVICE — ADHS LIQ MASTISOL 2/3ML

## (undated) DEVICE — TRY SKINPREP PVP SCRB W PAINT

## (undated) DEVICE — GLV SURG SENSICARE MICRO PF LF 8 STRL

## (undated) DEVICE — BNDG ADHS CURAD FLX/FABRC 1X3IN STRL LF

## (undated) DEVICE — SYR LUERLOK 5CC

## (undated) DEVICE — DRSNG WND GZ CURAD OIL EMULSION 3X8IN LF STRL 1PK

## (undated) DEVICE — ELECTRD RF ABL 10CM DISP

## (undated) DEVICE — CANNULATED DRILL: Brand: FIXOS

## (undated) DEVICE — MARKR SKIN W/RULR AND LBL

## (undated) DEVICE — APPL CHLORAPREP HI/LITE 26ML ORNG

## (undated) DEVICE — BASIN SET PACK: Brand: MEDLINE INDUSTRIES, INC.

## (undated) DEVICE — PAD CAST SOF ROL NS 4IN

## (undated) DEVICE — PAD GRND RF ABL DISP

## (undated) DEVICE — DRSNG WND GZ CURAD OIL EMULSION 3X16IN LF STRL

## (undated) DEVICE — CARBIDE BUR, OVAL CUTTING, 10 FLUTES, LONG, 5.5MM DI: Brand: MICROAIRE®

## (undated) DEVICE — GLV SURG PREMIERPRO MIC LTX PF SZ8 BRN

## (undated) DEVICE — THREADED GUIDE WIRE: Brand: FIXOS

## (undated) DEVICE — SUT ETHLN 4/0 PS2 PLSTC 1667G

## (undated) DEVICE — SPNG GZ STRL 2S 4X4 12PLY

## (undated) DEVICE — ENCORE® LATEX MICRO SIZE 8, STERILE LATEX POWDER-FREE SURGICAL GLOVE: Brand: ENCORE

## (undated) DEVICE — GLV SURG SENSICARE PI ORTHO SZ7.5 LF STRL